# Patient Record
Sex: FEMALE | Race: BLACK OR AFRICAN AMERICAN | NOT HISPANIC OR LATINO | URBAN - METROPOLITAN AREA
[De-identification: names, ages, dates, MRNs, and addresses within clinical notes are randomized per-mention and may not be internally consistent; named-entity substitution may affect disease eponyms.]

---

## 2019-03-10 ENCOUNTER — INPATIENT (INPATIENT)
Facility: HOSPITAL | Age: 84
LOS: 3 days | Discharge: HOME | End: 2019-03-14
Attending: FAMILY MEDICINE | Admitting: FAMILY MEDICINE
Payer: COMMERCIAL

## 2019-03-10 VITALS
SYSTOLIC BLOOD PRESSURE: 154 MMHG | HEIGHT: 65 IN | TEMPERATURE: 98 F | DIASTOLIC BLOOD PRESSURE: 55 MMHG | RESPIRATION RATE: 17 BRPM | OXYGEN SATURATION: 97 % | HEART RATE: 92 BPM | WEIGHT: 108.91 LBS

## 2019-03-10 DIAGNOSIS — E43 UNSPECIFIED SEVERE PROTEIN-CALORIE MALNUTRITION: ICD-10-CM

## 2019-03-10 LAB
ALBUMIN SERPL ELPH-MCNC: 3.9 G/DL — SIGNIFICANT CHANGE UP (ref 3.3–5)
ALP SERPL-CCNC: 78 U/L — SIGNIFICANT CHANGE UP (ref 40–120)
ALT FLD-CCNC: 20 U/L — SIGNIFICANT CHANGE UP (ref 12–78)
ANION GAP SERPL CALC-SCNC: 10 MMOL/L — SIGNIFICANT CHANGE UP (ref 5–17)
APPEARANCE UR: CLEAR — SIGNIFICANT CHANGE UP
APTT BLD: 27.1 SEC — LOW (ref 28.5–37)
AST SERPL-CCNC: 20 U/L — SIGNIFICANT CHANGE UP (ref 15–37)
BACTERIA # UR AUTO: ABNORMAL
BASOPHILS # BLD AUTO: 0.02 K/UL — SIGNIFICANT CHANGE UP (ref 0–0.2)
BASOPHILS NFR BLD AUTO: 0.2 % — SIGNIFICANT CHANGE UP (ref 0–2)
BILIRUB SERPL-MCNC: 1.1 MG/DL — SIGNIFICANT CHANGE UP (ref 0.2–1.2)
BILIRUB UR-MCNC: NEGATIVE — SIGNIFICANT CHANGE UP
BUN SERPL-MCNC: 19 MG/DL — SIGNIFICANT CHANGE UP (ref 7–23)
CALCIUM SERPL-MCNC: 9.3 MG/DL — SIGNIFICANT CHANGE UP (ref 8.5–10.1)
CHLORIDE SERPL-SCNC: 105 MMOL/L — SIGNIFICANT CHANGE UP (ref 96–108)
CK MB CFR SERPL CALC: <1 NG/ML — SIGNIFICANT CHANGE UP (ref 0.5–3.6)
CO2 SERPL-SCNC: 27 MMOL/L — SIGNIFICANT CHANGE UP (ref 22–31)
COLOR SPEC: YELLOW — SIGNIFICANT CHANGE UP
CREAT SERPL-MCNC: 1.12 MG/DL — SIGNIFICANT CHANGE UP (ref 0.5–1.3)
DIFF PNL FLD: ABNORMAL
EOSINOPHIL # BLD AUTO: 0 K/UL — SIGNIFICANT CHANGE UP (ref 0–0.5)
EOSINOPHIL NFR BLD AUTO: 0 % — SIGNIFICANT CHANGE UP (ref 0–6)
EPI CELLS # UR: SIGNIFICANT CHANGE UP
GLUCOSE SERPL-MCNC: 118 MG/DL — HIGH (ref 70–99)
GLUCOSE UR QL: NEGATIVE MG/DL — SIGNIFICANT CHANGE UP
HCT VFR BLD CALC: 44.2 % — SIGNIFICANT CHANGE UP (ref 34.5–45)
HGB BLD-MCNC: 14.5 G/DL — SIGNIFICANT CHANGE UP (ref 11.5–15.5)
IMM GRANULOCYTES NFR BLD AUTO: 0.3 % — SIGNIFICANT CHANGE UP (ref 0–1.5)
INR BLD: 1.04 RATIO — SIGNIFICANT CHANGE UP (ref 0.88–1.16)
KETONES UR-MCNC: ABNORMAL
LACTATE SERPL-SCNC: 3.4 MMOL/L — HIGH (ref 0.7–2)
LEUKOCYTE ESTERASE UR-ACNC: ABNORMAL
LIDOCAIN IGE QN: 84 U/L — SIGNIFICANT CHANGE UP (ref 73–393)
LYMPHOCYTES # BLD AUTO: 0.41 K/UL — LOW (ref 1–3.3)
LYMPHOCYTES # BLD AUTO: 3.6 % — LOW (ref 13–44)
MAGNESIUM SERPL-MCNC: 1.9 MG/DL — SIGNIFICANT CHANGE UP (ref 1.6–2.6)
MCHC RBC-ENTMCNC: 29.4 PG — SIGNIFICANT CHANGE UP (ref 27–34)
MCHC RBC-ENTMCNC: 32.8 GM/DL — SIGNIFICANT CHANGE UP (ref 32–36)
MCV RBC AUTO: 89.7 FL — SIGNIFICANT CHANGE UP (ref 80–100)
MONOCYTES # BLD AUTO: 0.3 K/UL — SIGNIFICANT CHANGE UP (ref 0–0.9)
MONOCYTES NFR BLD AUTO: 2.6 % — SIGNIFICANT CHANGE UP (ref 2–14)
NEUTROPHILS # BLD AUTO: 10.74 K/UL — HIGH (ref 1.8–7.4)
NEUTROPHILS NFR BLD AUTO: 93.3 % — HIGH (ref 43–77)
NITRITE UR-MCNC: NEGATIVE — SIGNIFICANT CHANGE UP
NRBC # BLD: 0 /100 WBCS — SIGNIFICANT CHANGE UP (ref 0–0)
PH UR: 8 — SIGNIFICANT CHANGE UP (ref 5–8)
PLATELET # BLD AUTO: 197 K/UL — SIGNIFICANT CHANGE UP (ref 150–400)
POTASSIUM SERPL-MCNC: 3.5 MMOL/L — SIGNIFICANT CHANGE UP (ref 3.5–5.3)
POTASSIUM SERPL-SCNC: 3.5 MMOL/L — SIGNIFICANT CHANGE UP (ref 3.5–5.3)
PROT SERPL-MCNC: 7.7 GM/DL — SIGNIFICANT CHANGE UP (ref 6–8.3)
PROT UR-MCNC: 30 MG/DL
PROTHROM AB SERPL-ACNC: 11.7 SEC — SIGNIFICANT CHANGE UP (ref 10–12.9)
RBC # BLD: 4.93 M/UL — SIGNIFICANT CHANGE UP (ref 3.8–5.2)
RBC # FLD: 13.9 % — SIGNIFICANT CHANGE UP (ref 10.3–14.5)
RBC CASTS # UR COMP ASSIST: ABNORMAL /HPF (ref 0–4)
SODIUM SERPL-SCNC: 142 MMOL/L — SIGNIFICANT CHANGE UP (ref 135–145)
SP GR SPEC: 1.01 — SIGNIFICANT CHANGE UP (ref 1.01–1.02)
TROPONIN I SERPL-MCNC: <.015 NG/ML — SIGNIFICANT CHANGE UP (ref 0.01–0.04)
UROBILINOGEN FLD QL: NEGATIVE MG/DL — SIGNIFICANT CHANGE UP
WBC # BLD: 11.5 K/UL — HIGH (ref 3.8–10.5)
WBC # FLD AUTO: 11.5 K/UL — HIGH (ref 3.8–10.5)
WBC UR QL: ABNORMAL

## 2019-03-10 PROCEDURE — 99285 EMERGENCY DEPT VISIT HI MDM: CPT

## 2019-03-10 PROCEDURE — 74177 CT ABD & PELVIS W/CONTRAST: CPT | Mod: 26

## 2019-03-10 RX ORDER — MORPHINE SULFATE 50 MG/1
4 CAPSULE, EXTENDED RELEASE ORAL ONCE
Qty: 0 | Refills: 0 | Status: DISCONTINUED | OUTPATIENT
Start: 2019-03-10 | End: 2019-03-10

## 2019-03-10 NOTE — ED ADULT NURSE NOTE - FINAL NURSING ELECTRONIC SIGNATURE
TRANSITIONAL CARE MANAGEMENT - HOSPITAL DISCHARGE FOLLOW-UP    Contacted Ms. Fowler regarding follow-up for  after hospital discharge. She was discharged from the hospital on 02-07-19. Review of the After Visit Summary from the recent hospitalization indicates that the patient needs to with     She feels that she is doing well at home.   Her diet concern is none. Overall, the patient is eating well.   Ambulation: improved  Fever: is present  Pain: none  Activities of Daily Living (global): Self-care   Patient states that she does have sufficient family support. She feels that she is able to ask for assistance when needed.     Additional patient/family concerns: None .    Discharge medications were verified with the patient. She is fully compliant with the medication regimen prescribed at the time of discharge. She reports that she is not experiencing any medication side effects.    Upon discharge, the patient was to receive no additional services.     Patient has an appointment on 02-19-19 with Christianne Montes MD. Ms. Fowler was reminded about the importance of keeping this appointment.      11-Mar-2019 02:37

## 2019-03-10 NOTE — ED ADULT NURSE NOTE - OBJECTIVE STATEMENT
pt c/o of burning sensation in the GI tract and nausea  at triage. at this moment pt denies pain and nausea. pt is spitting out thick white mucus. pt has history of high BP doesn't remember name of meds

## 2019-03-11 DIAGNOSIS — K51.00 ULCERATIVE (CHRONIC) PANCOLITIS WITHOUT COMPLICATIONS: ICD-10-CM

## 2019-03-11 DIAGNOSIS — E78.2 MIXED HYPERLIPIDEMIA: ICD-10-CM

## 2019-03-11 DIAGNOSIS — I10 ESSENTIAL (PRIMARY) HYPERTENSION: ICD-10-CM

## 2019-03-11 DIAGNOSIS — E03.9 HYPOTHYROIDISM, UNSPECIFIED: ICD-10-CM

## 2019-03-11 DIAGNOSIS — K21.9 GASTRO-ESOPHAGEAL REFLUX DISEASE WITHOUT ESOPHAGITIS: ICD-10-CM

## 2019-03-11 DIAGNOSIS — Z29.9 ENCOUNTER FOR PROPHYLACTIC MEASURES, UNSPECIFIED: ICD-10-CM

## 2019-03-11 LAB
ANION GAP SERPL CALC-SCNC: 10 MMOL/L — SIGNIFICANT CHANGE UP (ref 5–17)
BASOPHILS # BLD AUTO: 0.02 K/UL — SIGNIFICANT CHANGE UP (ref 0–0.2)
BASOPHILS NFR BLD AUTO: 0.2 % — SIGNIFICANT CHANGE UP (ref 0–2)
BUN SERPL-MCNC: 19 MG/DL — SIGNIFICANT CHANGE UP (ref 7–23)
CALCIUM SERPL-MCNC: 9.1 MG/DL — SIGNIFICANT CHANGE UP (ref 8.5–10.1)
CHLORIDE SERPL-SCNC: 104 MMOL/L — SIGNIFICANT CHANGE UP (ref 96–108)
CO2 SERPL-SCNC: 27 MMOL/L — SIGNIFICANT CHANGE UP (ref 22–31)
CREAT SERPL-MCNC: 1.15 MG/DL — SIGNIFICANT CHANGE UP (ref 0.5–1.3)
EOSINOPHIL # BLD AUTO: 0 K/UL — SIGNIFICANT CHANGE UP (ref 0–0.5)
EOSINOPHIL NFR BLD AUTO: 0 % — SIGNIFICANT CHANGE UP (ref 0–6)
GLUCOSE BLDC GLUCOMTR-MCNC: 106 MG/DL — HIGH (ref 70–99)
GLUCOSE SERPL-MCNC: 106 MG/DL — HIGH (ref 70–99)
HCT VFR BLD CALC: 41 % — SIGNIFICANT CHANGE UP (ref 34.5–45)
HGB BLD-MCNC: 13.6 G/DL — SIGNIFICANT CHANGE UP (ref 11.5–15.5)
IMM GRANULOCYTES NFR BLD AUTO: 0.5 % — SIGNIFICANT CHANGE UP (ref 0–1.5)
LACTATE SERPL-SCNC: 1.9 MMOL/L — SIGNIFICANT CHANGE UP (ref 0.7–2)
LYMPHOCYTES # BLD AUTO: 0.92 K/UL — LOW (ref 1–3.3)
LYMPHOCYTES # BLD AUTO: 8.6 % — LOW (ref 13–44)
MCHC RBC-ENTMCNC: 29.7 PG — SIGNIFICANT CHANGE UP (ref 27–34)
MCHC RBC-ENTMCNC: 33.2 GM/DL — SIGNIFICANT CHANGE UP (ref 32–36)
MCV RBC AUTO: 89.5 FL — SIGNIFICANT CHANGE UP (ref 80–100)
MONOCYTES # BLD AUTO: 0.47 K/UL — SIGNIFICANT CHANGE UP (ref 0–0.9)
MONOCYTES NFR BLD AUTO: 4.4 % — SIGNIFICANT CHANGE UP (ref 2–14)
NEUTROPHILS # BLD AUTO: 9.19 K/UL — HIGH (ref 1.8–7.4)
NEUTROPHILS NFR BLD AUTO: 86.3 % — HIGH (ref 43–77)
NRBC # BLD: 0 /100 WBCS — SIGNIFICANT CHANGE UP (ref 0–0)
PLATELET # BLD AUTO: 214 K/UL — SIGNIFICANT CHANGE UP (ref 150–400)
POTASSIUM SERPL-MCNC: 3.6 MMOL/L — SIGNIFICANT CHANGE UP (ref 3.5–5.3)
POTASSIUM SERPL-SCNC: 3.6 MMOL/L — SIGNIFICANT CHANGE UP (ref 3.5–5.3)
RBC # BLD: 4.58 M/UL — SIGNIFICANT CHANGE UP (ref 3.8–5.2)
RBC # FLD: 14.3 % — SIGNIFICANT CHANGE UP (ref 10.3–14.5)
SODIUM SERPL-SCNC: 141 MMOL/L — SIGNIFICANT CHANGE UP (ref 135–145)
WBC # BLD: 10.65 K/UL — HIGH (ref 3.8–10.5)
WBC # FLD AUTO: 10.65 K/UL — HIGH (ref 3.8–10.5)

## 2019-03-11 PROCEDURE — 12345: CPT | Mod: NC

## 2019-03-11 PROCEDURE — 71045 X-RAY EXAM CHEST 1 VIEW: CPT | Mod: 26

## 2019-03-11 PROCEDURE — 99223 1ST HOSP IP/OBS HIGH 75: CPT

## 2019-03-11 RX ORDER — PANTOPRAZOLE SODIUM 20 MG/1
40 TABLET, DELAYED RELEASE ORAL
Qty: 0 | Refills: 0 | Status: DISCONTINUED | OUTPATIENT
Start: 2019-03-12 | End: 2019-03-14

## 2019-03-11 RX ORDER — ONDANSETRON 8 MG/1
4 TABLET, FILM COATED ORAL ONCE
Qty: 0 | Refills: 0 | Status: COMPLETED | OUTPATIENT
Start: 2019-03-11 | End: 2019-03-11

## 2019-03-11 RX ORDER — ATORVASTATIN CALCIUM 80 MG/1
10 TABLET, FILM COATED ORAL AT BEDTIME
Qty: 0 | Refills: 0 | Status: DISCONTINUED | OUTPATIENT
Start: 2019-03-11 | End: 2019-03-14

## 2019-03-11 RX ORDER — LEVOTHYROXINE SODIUM 125 MCG
25 TABLET ORAL DAILY
Qty: 0 | Refills: 0 | Status: DISCONTINUED | OUTPATIENT
Start: 2019-03-11 | End: 2019-03-14

## 2019-03-11 RX ORDER — METRONIDAZOLE 500 MG
500 TABLET ORAL ONCE
Qty: 0 | Refills: 0 | Status: COMPLETED | OUTPATIENT
Start: 2019-03-11 | End: 2019-03-11

## 2019-03-11 RX ORDER — PANTOPRAZOLE SODIUM 20 MG/1
40 TABLET, DELAYED RELEASE ORAL EVERY 12 HOURS
Qty: 0 | Refills: 0 | Status: DISCONTINUED | OUTPATIENT
Start: 2019-03-11 | End: 2019-03-11

## 2019-03-11 RX ORDER — ALPRAZOLAM 0.25 MG
0.25 TABLET ORAL THREE TIMES A DAY
Qty: 0 | Refills: 0 | Status: DISCONTINUED | OUTPATIENT
Start: 2019-03-11 | End: 2019-03-14

## 2019-03-11 RX ORDER — ONDANSETRON 8 MG/1
8 TABLET, FILM COATED ORAL EVERY 8 HOURS
Qty: 0 | Refills: 0 | Status: DISCONTINUED | OUTPATIENT
Start: 2019-03-11 | End: 2019-03-14

## 2019-03-11 RX ORDER — PANTOPRAZOLE SODIUM 20 MG/1
40 TABLET, DELAYED RELEASE ORAL ONCE
Qty: 0 | Refills: 0 | Status: COMPLETED | OUTPATIENT
Start: 2019-03-11 | End: 2019-03-11

## 2019-03-11 RX ORDER — MORPHINE SULFATE 50 MG/1
2 CAPSULE, EXTENDED RELEASE ORAL ONCE
Qty: 0 | Refills: 0 | Status: DISCONTINUED | OUTPATIENT
Start: 2019-03-11 | End: 2019-03-11

## 2019-03-11 RX ORDER — LISINOPRIL 2.5 MG/1
2.5 TABLET ORAL DAILY
Qty: 0 | Refills: 0 | Status: DISCONTINUED | OUTPATIENT
Start: 2019-03-11 | End: 2019-03-14

## 2019-03-11 RX ORDER — CIPROFLOXACIN LACTATE 400MG/40ML
200 VIAL (ML) INTRAVENOUS EVERY 12 HOURS
Qty: 0 | Refills: 0 | Status: DISCONTINUED | OUTPATIENT
Start: 2019-03-11 | End: 2019-03-12

## 2019-03-11 RX ORDER — METRONIDAZOLE 500 MG
500 TABLET ORAL EVERY 8 HOURS
Qty: 0 | Refills: 0 | Status: DISCONTINUED | OUTPATIENT
Start: 2019-03-11 | End: 2019-03-12

## 2019-03-11 RX ORDER — SODIUM CHLORIDE 9 MG/ML
1000 INJECTION, SOLUTION INTRAVENOUS
Qty: 0 | Refills: 0 | Status: DISCONTINUED | OUTPATIENT
Start: 2019-03-11 | End: 2019-03-11

## 2019-03-11 RX ORDER — CIPROFLOXACIN LACTATE 400MG/40ML
400 VIAL (ML) INTRAVENOUS ONCE
Qty: 0 | Refills: 0 | Status: COMPLETED | OUTPATIENT
Start: 2019-03-11 | End: 2019-03-11

## 2019-03-11 RX ORDER — HEPARIN SODIUM 5000 [USP'U]/ML
5000 INJECTION INTRAVENOUS; SUBCUTANEOUS EVERY 8 HOURS
Qty: 0 | Refills: 0 | Status: DISCONTINUED | OUTPATIENT
Start: 2019-03-11 | End: 2019-03-14

## 2019-03-11 RX ORDER — CIPROFLOXACIN LACTATE 400MG/40ML
400 VIAL (ML) INTRAVENOUS EVERY 12 HOURS
Qty: 0 | Refills: 0 | Status: DISCONTINUED | OUTPATIENT
Start: 2019-03-11 | End: 2019-03-11

## 2019-03-11 RX ADMIN — Medication 100 MILLIGRAM(S): at 11:14

## 2019-03-11 RX ADMIN — LISINOPRIL 2.5 MILLIGRAM(S): 2.5 TABLET ORAL at 05:33

## 2019-03-11 RX ADMIN — ONDANSETRON 4 MILLIGRAM(S): 8 TABLET, FILM COATED ORAL at 02:50

## 2019-03-11 RX ADMIN — HEPARIN SODIUM 5000 UNIT(S): 5000 INJECTION INTRAVENOUS; SUBCUTANEOUS at 05:34

## 2019-03-11 RX ADMIN — Medication 200 MILLIGRAM(S): at 01:51

## 2019-03-11 RX ADMIN — Medication 100 MILLIGRAM(S): at 17:39

## 2019-03-11 RX ADMIN — MORPHINE SULFATE 2 MILLIGRAM(S): 50 CAPSULE, EXTENDED RELEASE ORAL at 02:49

## 2019-03-11 RX ADMIN — Medication 100 MILLIGRAM(S): at 03:31

## 2019-03-11 RX ADMIN — PANTOPRAZOLE SODIUM 40 MILLIGRAM(S): 20 TABLET, DELAYED RELEASE ORAL at 05:34

## 2019-03-11 RX ADMIN — PANTOPRAZOLE SODIUM 40 MILLIGRAM(S): 20 TABLET, DELAYED RELEASE ORAL at 00:20

## 2019-03-11 RX ADMIN — Medication 100 MILLIGRAM(S): at 23:06

## 2019-03-11 RX ADMIN — Medication 25 MICROGRAM(S): at 05:33

## 2019-03-11 RX ADMIN — Medication 100 MILLIGRAM(S): at 13:00

## 2019-03-11 RX ADMIN — HEPARIN SODIUM 5000 UNIT(S): 5000 INJECTION INTRAVENOUS; SUBCUTANEOUS at 13:00

## 2019-03-11 RX ADMIN — HEPARIN SODIUM 5000 UNIT(S): 5000 INJECTION INTRAVENOUS; SUBCUTANEOUS at 21:16

## 2019-03-11 RX ADMIN — ATORVASTATIN CALCIUM 10 MILLIGRAM(S): 80 TABLET, FILM COATED ORAL at 21:15

## 2019-03-11 RX ADMIN — Medication 0.25 MILLIGRAM(S): at 20:32

## 2019-03-11 RX ADMIN — SODIUM CHLORIDE 80 MILLILITER(S): 9 INJECTION, SOLUTION INTRAVENOUS at 03:27

## 2019-03-11 NOTE — H&P ADULT - PROBLEM SELECTOR PLAN 1
Zofran 8 mg IVPB Q8hrly PRN for nausea/vomiting  Sent Stool culture, C.diff, Ova parasite and Blood culture x 2 sets  Continue Cipro and Flagyl  Tylenol 650mg PO Q8hrly PRN for fever & pain.  Start with Clear liquids PO when patient tolerates advance diet. Zofran 8 mg IVPB Q8hrly PRN for nausea/vomiting  Sent Stool culture, C.diff, Ova parasite and Blood culture x 2 sets  Continue Cipro and Flagyl  Tylenol 650mg PO Q8hrly PRN for fever & pain.  Start with Clear liquids PO when patient tolerates advance diet.  Would confirm PMH regarding UC vs diverticulitis with PMD.  Per patient and daughter, she was ever on steroids or other UC meds. Zofran 8 mg IVP Q8hrly PRN for nausea/vomiting  Sent Stool culture, Ova parasite and Blood culture x 2 sets  Continue Cipro and Flagyl  Tylenol 650mg PO Q8hrly PRN for fever & pain.  Start with Clear liquids PO when patient tolerates advance diet.  Would confirm PMH regarding UC vs diverticulitis with PMD.  Per patient and daughter, she was ever on steroids or other UC meds.

## 2019-03-11 NOTE — CHART NOTE - NSCHARTNOTEFT_GEN_A_CORE
Patient is a 85y old  Female who presents with a chief complaint of colitis (11 Mar 2019 01:39)        HPI:  85 year old woman with PMH HTN, hypothyroidism, HLD, gerd, (diverticulitis or UC? pt unsure), hiatal hernia presents with several days of abdominal pain, diarrhea, and nausea.  She states that she think she was diagnosed with either diverticulitis or UC many years ago; her daughter is bedside who does have UC and is unsure if patient has it or was ever on treatment for it.  Patient denies fever, chills, recent travel, food outside of routine, blood in stool.    CT ab/plv shows colitis without diverticulitis.  Mild leukocytosis. (11 Mar 2019 01:39)      SUBJECTIVE & OBJECTIVE: Pt seen and examined at bedside. she is tolerating clear liquids.  States that her abdominal pain in improving     PHYSICAL EXAM:  T(C): 37.3 (19 @ 11:48), Max: 37.6 (19 @ 03:08)  HR: 75 (19 @ 11:48) (72 - 92)  BP: 125/68 (19 @ 11:48) (122/68 - 154/55)  RR: 16 (19 @ 11:48) (16 - 18)  SpO2: 99% (19 @ 11:48) (97% - 99%)  Wt(kg): -- Height (cm): 165.1 ( @ 03:08)  Weight (kg): 49.6 ( @ 03:08)  BMI (kg/m2): 18.2 ( @ 03:08)  BSA (m2): 1.53 ( @ 03:08)  I&O's Detail    GENERAL: NAD, well-groomed, well-developed  HEAD:  Atraumatic, Normocephalic  EYES: EOMI, PERRLA, conjunctiva and sclera clear  ENMT: Moist mucous membranes  NECK: Supple, No JVD  NERVOUS SYSTEM:  Alert & Oriented X3, Motor Strength 5/5 B/L upper and lower extremities; DTRs 2+ intact and symmetric  CHEST/LUNG: Clear to auscultation bilaterally; No rales, rhonchi, wheezing, or rubs  HEART: Regular rate and rhythm; No murmurs, rubs, or gallops  ABDOMEN: Soft, Nontender, Nondistended; Bowel sounds present  EXTREMITIES:  2+ Peripheral Pulses, No clubbing, cyanosis, or edema    MEDICATIONS  (STANDING):  atorvastatin 10 milliGRAM(s) Oral at bedtime  ciprofloxacin   IVPB 200 milliGRAM(s) IV Intermittent every 12 hours  heparin  Injectable 5000 Unit(s) SubCutaneous every 8 hours  levothyroxine 25 MICROGram(s) Oral daily  lisinopril 2.5 milliGRAM(s) Oral daily  metroNIDAZOLE  IVPB 500 milliGRAM(s) IV Intermittent every 8 hours  sodium chloride 0.45%. 1000 milliLiter(s) (80 mL/Hr) IV Continuous <Continuous>    MEDICATIONS  (PRN):  ALPRAZolam 0.25 milliGRAM(s) Oral three times a day PRN anxiety  ondansetron Injectable 8 milliGRAM(s) IV Push every 8 hours PRN Nausea and/or Vomiting      LABS:                        13.6   10.65 )-----------( 214      ( 11 Mar 2019 08:18 )             41.0     03-11    141  |  104  |  19  ----------------------------<  106<H>  3.6   |  27  |  1.15    Ca    9.1      11 Mar 2019 08:18  Mg     1.9     03-10    TPro  7.7  /  Alb  3.9  /  TBili  1.1  /  DBili  x   /  AST  20  /  ALT  20  /  AlkPhos  78  03-10    PT/INR - ( 10 Mar 2019 22:13 )   PT: 11.7 sec;   INR: 1.04 ratio         PTT - ( 10 Mar 2019 22:13 )  PTT:27.1 sec  Urinalysis Basic - ( 10 Mar 2019 22:58 )    Color: Yellow / Appearance: Clear / S.010 / pH: x  Gluc: x / Ketone: Large  / Bili: Negative / Urobili: Negative mg/dL   Blood: x / Protein: 30 mg/dL / Nitrite: Negative   Leuk Esterase: Trace / RBC: 6-10 /HPF / WBC 11-25   Sq Epi: x / Non Sq Epi: Few / Bacteria: Few      CAPILLARY BLOOD GLUCOSE  POCT Blood Glucose.: 96 mg/dL (11 Mar 2019 09:07)      CARDIAC MARKERS ( 10 Mar 2019 22:13 )  <.015 ng/mL / x     / x     / x     / <1.0 ng/mL        RECENT CULTURES: pending urine culture      RADIOLOGY & ADDITIONAL TESTS:  < from: CT Abdomen and Pelvis w/ IV Cont (03.10.19 @ 23:49) >      IMPRESSION:    Duodenojejunitis. Colitis involving the transverse, descending and   sigmoid colon. No evidence of small bowel obstruction.    Benign-appearing 4.7 cm cyst in the left lower quadrant. Correlation with   prior imaging is suggested.    < end of copied text >        DVT/GI ppx  Discussed with pt @ bedside    A/P  85 year old woman with PMH HTN, hypothyroidism, HLD, gerd, (diverticulitis or UC? pt unsure), hiatal hernia presents with several days of abdominal pain, diarrhea, and nausea.  Patient will require admission for at least 2 midnights as detailed below:      Problem/Plan - 1:  ·  Problem: Pancolitis.  Plan: Zofran 8 mg IVP Q8hrly PRN for nausea/vomiting  Sent Stool culture, Ova parasite and Blood culture x 2 sets  Continue Cipro and Flagyl  Tylenol 650mg PO Q8hrly PRN for fever & pain.  Start Low fiber diet  Would confirm PMH regarding UC vs diverticulitis with PMD.  Per patient and daughter, she was ever on steroids or other UC meds.    Problem/Plan - 2:  ·  Problem: Essential hypertension.  Plan: Continue lisinopril.     Problem/Plan - 3:  ·  Problem: Hypothyroidism (acquired).  Plan: Continue Synthroid.     Problem/Plan - 4:  ·  Problem: Gastroesophageal reflux disease without esophagitis.  Plan: Continue PPI.     Problem/Plan - 5:  ·  Problem: Mixed hyperlipidemia.  Plan: Continue statin.     Problem/Plan - 6:  Problem: Preventive measure. Plan: heparin 5000 units SC q8h for DVT prophylaxis  General precautions.

## 2019-03-11 NOTE — ED PROVIDER NOTE - PHYSICAL EXAMINATION
Gen: Alert, NAD, thin but well groomed elderly  female   Head: NC, AT   Eyes: PERRL, EOMI, normal lids/conjunctiva  ENT: normal hearing, patent oropharynx without erythema/exudate, uvula midline  Neck: supple, no tenderness, Trachea midline  Pulm: Bilateral BS, normal resp effort, no wheeze/stridor/retractions  CV: RRR, no M/R/G, 2+ radial and dp pulses bl, no edema  Abd: soft, periumbilical ttp. +BS, no hepatosplenomegaly  Mskel: extremities x4 with normal ROM and no joint effusions. no ctl spine ttp.   Skin: no rash, no bruising   Neuro: AAOx3, no sensory/motor deficits, CN 2-12 intact

## 2019-03-11 NOTE — H&P ADULT - NSHPPHYSICALEXAM_GEN_ALL_CORE
GENERAL: NAD, well-groomed, well-developed  HEAD:  Atraumatic, Normocephalic  EYES: EOMI, PERRLA, conjunctiva and sclera clear  ENMT: No tonsillar erythema, exudates, or enlargement; Moist mucous membranes, No lesions  NECK: Supple, No JVD, Normal thyroid  NERVOUS SYSTEM:  Alert & Oriented X3, Good concentration  CHEST/LUNG: Clear to percussion bilaterally; No rales, rhonchi, wheezing, or rubs  HEART: Regular rate and rhythm; No murmurs, rubs, or gallops  ABDOMEN: Soft, tender without rebound or guarding, Nondistended; Bowel sounds present  EXTREMITIES: no clubbing, cyanosis, or edema  SKIN: no rashes or lesions  PSYCH: no rashes or lesions

## 2019-03-11 NOTE — H&P ADULT - ASSESSMENT
85 year old woman with PMH HTN, hypothyroidism, HLD, gerd, (diverticulitis or UC? pt unsure), hiatal hernia presents with several days of abdominal pain, diarrhea, and nausea.  Patient will require admission for at least 2 midnights as detailed below:    IMPROVE VTE Individual Risk Assessment          RISK                                                          Points    [  ] Previous VTE                                                3    [  ] Thrombophilia                                             2    [  ] Lower limb paralysis                                    2        (unable to hold up >15 seconds)      [  ] Current Cancer                                             2         (within 6 months)    [ x ] Immobilization > 24 hrs                              1    [  ] ICU/CCU stay > 24 hours                            1    [ x ] Age > 60                                                    1    IMPROVE VTE Score ___2______

## 2019-03-11 NOTE — H&P ADULT - HISTORY OF PRESENT ILLNESS
Pt admitted for colitis, in progress. 85 year old woman with PMH HTN, hypothyroidism, HLD, gerd, (diverticulitis or UC? pt unsure), hiatal hernia presents with several days of abdominal pain, diarrhea, and nausea.  She states that she think she was diagnosed with either diverticulitis or UC many years ago; her daughter is bedside who does have UC and is unsure if patient has it or was ever on treatment for it.  Patient denies fever, chills, recent travel, food outside of routine, blood in stool. 85 year old woman with PMH HTN, hypothyroidism, HLD, gerd, (diverticulitis or UC? pt unsure), hiatal hernia presents with several days of abdominal pain, diarrhea, and nausea.  She states that she think she was diagnosed with either diverticulitis or UC many years ago; her daughter is bedside who does have UC and is unsure if patient has it or was ever on treatment for it.  Patient denies fever, chills, recent travel, food outside of routine, blood in stool.    CT ab/plv shows colitis without diverticulitis.  Mild leukocytosis.

## 2019-03-11 NOTE — PHARMACOTHERAPY INTERVENTION NOTE - COMMENTS
spoke to dr arana to change protonix ivp to protonix 40mg orally once daily
crcl=28  cipro 400mg ivpb q12h changed to 200mg ivpb q12h

## 2019-03-11 NOTE — H&P ADULT - NSHPLABSRESULTS_GEN_ALL_CORE
Vital Signs Last 24 Hrs  T(C): 37.6 (11 Mar 2019 03:08), Max: 37.6 (11 Mar 2019 03:08)  T(F): 99.7 (11 Mar 2019 03:08), Max: 99.7 (11 Mar 2019 03:08)  HR: 72 (11 Mar 2019 03:08) (72 - 92)  BP: 122/68 (11 Mar 2019 03:08) (122/68 - 154/55)  BP(mean): --  RR: 16 (11 Mar 2019 03:08) (16 - 18)  SpO2: 99% (11 Mar 2019 03:08) (97% - 99%)          LABS:                        14.5   11.50 )-----------( 197      ( 10 Mar 2019 22:13 )             44.2     03-10    142  |  105  |  19  ----------------------------<  118<H>  3.5   |  27  |  1.12    Ca    9.3      10 Mar 2019 22:13  Mg     1.9     03-10    TPro  7.7  /  Alb  3.9  /  TBili  1.1  /  DBili  x   /  AST  20  /  ALT  20  /  AlkPhos  78  03-10    PT/INR - ( 10 Mar 2019 22:13 )   PT: 11.7 sec;   INR: 1.04 ratio         PTT - ( 10 Mar 2019 22:13 )  PTT:27.1 sec  Urinalysis Basic - ( 10 Mar 2019 22:58 )    Color: Yellow / Appearance: Clear / S.010 / pH: x  Gluc: x / Ketone: Large  / Bili: Negative / Urobili: Negative mg/dL   Blood: x / Protein: 30 mg/dL / Nitrite: Negative   Leuk Esterase: Trace / RBC: 6-10 /HPF / WBC 11-25   Sq Epi: x / Non Sq Epi: Few / Bacteria: Few        RADIOLOGY & ADDITIONAL STUDIES:    CT ab/Plv:  IMPRESSION:  Duodenojejunitis. Colitis involving the transverse, descending and   sigmoid colon. No evidence of small bowel obstruction.

## 2019-03-11 NOTE — PATIENT PROFILE ADULT - ABILITY TO HEAR (WITH HEARING AID OR HEARING APPLIANCE IF NORMALLY USED):
Left ear is very diminished/Mildly to Moderately Impaired: difficulty hearing in some environments or speaker may need to increase volume or speak distinctly

## 2019-03-11 NOTE — ED PROVIDER NOTE - OBJECTIVE STATEMENT
Pertinent PMH/PSH/FHx/SHx and Review of Systems contained within:  85F hx acid reflux, htn, hld, hypothyroidism pw abd pain largely periumbilical and sharp with nausea and vomiting x1 day. patient notes she has been having such bouts intermittently for years and is treated with asiphux. patient has no fever, diarrhea, cp, sob, rash, bleeding, weakness, rhinorrhea, vision blurring. she did not take anything additional for symptoms. she did not eat or drink anything unusual  Fh and Sh not otherwise contributory  ROS otherwise negative

## 2019-03-11 NOTE — ED PROVIDER NOTE - CLINICAL SUMMARY MEDICAL DECISION MAKING FREE TEXT BOX
pt pw abd pain with vomiting. labs show pancolitis and enteritis. will start on abx and ppi and admit.

## 2019-03-12 LAB
GLUCOSE BLDC GLUCOMTR-MCNC: 96 MG/DL — SIGNIFICANT CHANGE UP (ref 70–99)
GLUCOSE BLDC GLUCOMTR-MCNC: 97 MG/DL — SIGNIFICANT CHANGE UP (ref 70–99)

## 2019-03-12 PROCEDURE — 99233 SBSQ HOSP IP/OBS HIGH 50: CPT

## 2019-03-12 RX ORDER — METRONIDAZOLE 500 MG
500 TABLET ORAL EVERY 8 HOURS
Qty: 0 | Refills: 0 | Status: DISCONTINUED | OUTPATIENT
Start: 2019-03-12 | End: 2019-03-14

## 2019-03-12 RX ORDER — CIPROFLOXACIN LACTATE 400MG/40ML
250 VIAL (ML) INTRAVENOUS
Qty: 0 | Refills: 0 | Status: DISCONTINUED | OUTPATIENT
Start: 2019-03-12 | End: 2019-03-14

## 2019-03-12 RX ADMIN — Medication 250 MILLIGRAM(S): at 18:41

## 2019-03-12 RX ADMIN — Medication 30 MILLILITER(S): at 16:27

## 2019-03-12 RX ADMIN — Medication 100 MILLIGRAM(S): at 01:23

## 2019-03-12 RX ADMIN — Medication 25 MICROGRAM(S): at 05:24

## 2019-03-12 RX ADMIN — Medication 500 MILLIGRAM(S): at 22:33

## 2019-03-12 RX ADMIN — LISINOPRIL 2.5 MILLIGRAM(S): 2.5 TABLET ORAL at 05:24

## 2019-03-12 RX ADMIN — HEPARIN SODIUM 5000 UNIT(S): 5000 INJECTION INTRAVENOUS; SUBCUTANEOUS at 22:33

## 2019-03-12 RX ADMIN — Medication 100 MILLIGRAM(S): at 12:02

## 2019-03-12 RX ADMIN — HEPARIN SODIUM 5000 UNIT(S): 5000 INJECTION INTRAVENOUS; SUBCUTANEOUS at 13:48

## 2019-03-12 RX ADMIN — Medication 0.25 MILLIGRAM(S): at 17:54

## 2019-03-12 RX ADMIN — PANTOPRAZOLE SODIUM 40 MILLIGRAM(S): 20 TABLET, DELAYED RELEASE ORAL at 05:24

## 2019-03-12 RX ADMIN — ATORVASTATIN CALCIUM 10 MILLIGRAM(S): 80 TABLET, FILM COATED ORAL at 22:33

## 2019-03-12 RX ADMIN — Medication 0.25 MILLIGRAM(S): at 05:31

## 2019-03-12 RX ADMIN — Medication 100 MILLIGRAM(S): at 13:45

## 2019-03-12 NOTE — PROGRESS NOTE ADULT - SUBJECTIVE AND OBJECTIVE BOX
Patient is a 85y old  Female who presents with a chief complaint of colitis (11 Mar 2019 01:39)        HPI:  85 year old woman with PMH HTN, hypothyroidism, HLD, gerd, (diverticulitis or UC? pt unsure), hiatal hernia presents with several days of abdominal pain, diarrhea, and nausea.  She states that she think she was diagnosed with either diverticulitis or UC many years ago; her daughter is bedside who does have UC and is unsure if patient has it or was ever on treatment for it.  Patient denies fever, chills, recent travel, food outside of routine, blood in stool.    CT ab/plv shows colitis without diverticulitis.  Mild leukocytosis. (11 Mar 2019 01:39)      SUBJECTIVE & OBJECTIVE: Pt seen and examined at bedside. Doing well today no episodes of fever or diarrhea.     PHYSICAL EXAM:  T(C): 36.9 (19 @ 10:15), Max: 37.3 (19 @ 17:09)  HR: 76 (19 @ 10:15) (76 - 91)  BP: 132/88 (19 @ 10:15) (106/63 - 143/92)  RR: 17 (19 @ 10:15) (17 - 17)  SpO2: 98% (19 @ 10:15) (98% - 100%)  Wt(kg): --   I&O's Detail    11 Mar 2019 07:01  -  12 Mar 2019 07:00  --------------------------------------------------------  IN:    Oral Fluid: 240 mL  Total IN: 240 mL    OUT:  Total OUT: 0 mL    Total NET: 240 mL        GENERAL: thin, chronically ill appearing   HEAD:  Atraumatic, Normocephalic  EYES: EOMI, PERRLA, conjunctiva and sclera clear  ENMT: Moist mucous membranes  NECK: Supple, No JVD  NERVOUS SYSTEM:  Alert & Oriented X3, Motor Strength 5/5 B/L upper and lower extremities; DTRs 2+ intact and symmetric  CHEST/LUNG: Clear to auscultation bilaterally; No rales, rhonchi, wheezing, or rubs  HEART: Regular rate and rhythm; No murmurs, rubs, or gallops  ABDOMEN: Soft, Nontender, Nondistended; Bowel sounds present  EXTREMITIES:  2+ Peripheral Pulses, No clubbing, cyanosis, or edema    MEDICATIONS  (STANDING):  atorvastatin 10 milliGRAM(s) Oral at bedtime  ciprofloxacin   IVPB 200 milliGRAM(s) IV Intermittent every 12 hours  heparin  Injectable 5000 Unit(s) SubCutaneous every 8 hours  levothyroxine 25 MICROGram(s) Oral daily  lisinopril 2.5 milliGRAM(s) Oral daily  metroNIDAZOLE  IVPB 500 milliGRAM(s) IV Intermittent every 8 hours  pantoprazole    Tablet 40 milliGRAM(s) Oral before breakfast    MEDICATIONS  (PRN):  ALPRAZolam 0.25 milliGRAM(s) Oral three times a day PRN anxiety  aluminum hydroxide/magnesium hydroxide/simethicone Suspension 30 milliLiter(s) Oral every 6 hours PRN Dyspepsia  ondansetron Injectable 8 milliGRAM(s) IV Push every 8 hours PRN Nausea and/or Vomiting      LABS:                        13.6   10.65 )-----------( 214      ( 11 Mar 2019 08:18 )             41.0     03-11    141  |  104  |  19  ----------------------------<  106<H>  3.6   |  27  |  1.15    Ca    9.1      11 Mar 2019 08:18  Mg     1.9     03-10    TPro  7.7  /  Alb  3.9  /  TBili  1.1  /  DBili  x   /  AST  20  /  ALT  20  /  AlkPhos  78  03-10    PT/INR - ( 10 Mar 2019 22:13 )   PT: 11.7 sec;   INR: 1.04 ratio         PTT - ( 10 Mar 2019 22:13 )  PTT:27.1 sec  Urinalysis Basic - ( 10 Mar 2019 22:58 )    Color: Yellow / Appearance: Clear / S.010 / pH: x  Gluc: x / Ketone: Large  / Bili: Negative / Urobili: Negative mg/dL   Blood: x / Protein: 30 mg/dL / Nitrite: Negative   Leuk Esterase: Trace / RBC: 6-10 /HPF / WBC 11-25   Sq Epi: x / Non Sq Epi: Few / Bacteria: Few      CAPILLARY BLOOD GLUCOSE      POCT Blood Glucose.: 97 mg/dL (12 Mar 2019 16:32)  POCT Blood Glucose.: 106 mg/dL (11 Mar 2019 17:35)      CARDIAC MARKERS ( 10 Mar 2019 22:13 )  <.015 ng/mL / x     / x     / x     / <1.0 ng/mL        RECENT CULTURES:      RADIOLOGY & ADDITIONAL TESTS:      DVT/GI ppx  Discussed with pt @ bedside

## 2019-03-12 NOTE — PROGRESS NOTE ADULT - NSICDXPROBLEM_GEN_ALL_CORE_FT
PROBLEM DIAGNOSES  Problem: Pancolitis  Assessment and Plan: c/w IV abx  GI Eval,   low residual diet

## 2019-03-12 NOTE — PROGRESS NOTE ADULT - ASSESSMENT
86 y/o female with diarrhea and abdominal pain     Problem/Plan - 1:  ·  Problem: Pancolitis.  Plan: Zofran 8 mg IVP Q8hrly PRN for nausea/vomiting  Sent Stool culture, Ova parasite and Blood culture x 2 sets  Continue Cipro and Flagyl  Tylenol 650mg PO Q8hrly PRN for fever & pain.  continue low fiber diet  GI eval      Problem/Plan - 2:  ·  Problem: Essential hypertension.  Plan: Continue lisinopril.     Problem/Plan - 3:  ·  Problem: Hypothyroidism (acquired).  Plan: Continue Synthroid.     Problem/Plan - 4:  ·  Problem: Gastroesophageal reflux disease without esophagitis.  Plan: Continue PPI.     Problem/Plan - 5:  ·  Problem: Mixed hyperlipidemia.  Plan: Continue statin.

## 2019-03-12 NOTE — CONSULT NOTE ADULT - ASSESSMENT
HPI:  85 year old woman with PMH HTN, hypothyroidism, HLD, gerd, (diverticulitis or UC? pt unsure), hiatal hernia presents with several days of abdominal pain, diarrhea, and nausea.  She states that she think she was diagnosed with either diverticulitis or UC many years ago; her daughter is bedside who does have UC and is unsure if patient has it or was ever on treatment for it.  Patient denies fever, chills, recent travel, food outside of routine, blood in stool.    CT ab/plv shows colitis without diverticulitis.  Mild leukocytosis. (11 Mar 2019 01:39)  ----------------------- As Above -----------------------------------  Histopry obtained from patient and daughter. Patient presented with significant burning in her chest that radiated to her neck. This is not unusual but it is never to this degree or as frequent. The patient is on Acufex for it.  She also had mucus the size of a quarter through out Sunday. ( non bloody ) It is not associated with her previous flare ups for her UC. The patient states that she has a history of UC diagnosed > 20 years ago. No flare ups for a while. She is on no meds for it. Last colonoscopy ( negative ) was 3 - 5 years ag.  Patient states that she had no BMs since arrival. Chest slightly better. Biggest problem today is anxiety. Tolerating regular diet.  See CT scan / labs    1) GERD - Carafate, PPI, anti reflux regiment  2) Colitis - Significantly better. - Change anti biotics to PO. Patient stable from GI point of view for discharge in AM if she remains asymptomatic and tolerates diet.

## 2019-03-12 NOTE — CONSULT NOTE ADULT - SUBJECTIVE AND OBJECTIVE BOX
HPI:  85 year old woman with PMH HTN, hypothyroidism, HLD, gerd, (diverticulitis or UC? pt unsure), hiatal hernia presents with several days of abdominal pain, diarrhea, and nausea.  She states that she think she was diagnosed with either diverticulitis or UC many years ago; her daughter is bedside who does have UC and is unsure if patient has it or was ever on treatment for it.  Patient denies fever, chills, recent travel, food outside of routine, blood in stool.    CT ab/plv shows colitis without diverticulitis.  Mild leukocytosis. (11 Mar 2019 01:39)  ----------------------- As Above -----------------------------------  Histopry obtained from patient and daughter. Patient presented with significant burning in her chest that radiated to her neck. This is not unusual but it is never to this degree or as frequent. The patient is on Acufex for it.  She also had       PAST MEDICAL & SURGICAL HISTORY:      MEDICATIONS  (STANDING):  atorvastatin 10 milliGRAM(s) Oral at bedtime  ciprofloxacin   IVPB 200 milliGRAM(s) IV Intermittent every 12 hours  heparin  Injectable 5000 Unit(s) SubCutaneous every 8 hours  levothyroxine 25 MICROGram(s) Oral daily  lisinopril 2.5 milliGRAM(s) Oral daily  metroNIDAZOLE  IVPB 500 milliGRAM(s) IV Intermittent every 8 hours  pantoprazole    Tablet 40 milliGRAM(s) Oral before breakfast    MEDICATIONS  (PRN):  ALPRAZolam 0.25 milliGRAM(s) Oral three times a day PRN anxiety  aluminum hydroxide/magnesium hydroxide/simethicone Suspension 30 milliLiter(s) Oral every 6 hours PRN Dyspepsia  ondansetron Injectable 8 milliGRAM(s) IV Push every 8 hours PRN Nausea and/or Vomiting      Allergies    No Known Allergies    Intolerances    penicillins (Unknown)      FAMILY HISTORY:      REVIEW OF SYSTEMS:    CONSTITUTIONAL: No fever, weight loss, or fatigue  EYES: No eye pain, visual disturbances, or discharge  ENMT:  No difficulty hearing, tinnitus, vertigo; No sinus or throat pain  NECK: No pain or stiffness  BREASTS: No pain, masses, or nipple discharge  RESPIRATORY: No cough, wheezing, chills or hemoptysis; No shortness of breath  CARDIOVASCULAR: No chest pain, palpitations, dizziness, or leg swelling  GASTROINTESTINAL: No abdominal or epigastric pain. No nausea, vomiting, or hematemesis; No diarrhea or constipation. No melena or hematochezia.  GENITOURINARY: No dysuria, frequency, hematuria, or incontinence  NEUROLOGICAL: No headaches, memory loss, loss of strength, numbness, or tremors  SKIN: No itching, burning, rashes, or lesions   LYMPH NODES: No enlarged glands  ENDOCRINE: No heat or cold intolerance; No hair loss  MUSCULOSKELETAL: No joint pain or swelling; No muscle, back, or extremity pain  PSYCHIATRIC: No depression, anxiety, mood swings, or difficulty sleeping  HEME/LYMPH: No easy bruising, or bleeding gums  ALLERGY AND IMMUNOLOGIC: No hives or eczema          SOCIAL HISTORY:    FAMILY HISTORY:      Vital Signs Last 24 Hrs  T(C): 36.9 (12 Mar 2019 10:15), Max: 37.3 (11 Mar 2019 17:09)  T(F): 98.4 (12 Mar 2019 10:15), Max: 99.2 (11 Mar 2019 17:09)  HR: 76 (12 Mar 2019 10:15) (76 - 91)  BP: 132/88 (12 Mar 2019 10:15) (106/63 - 143/92)  BP(mean): --  RR: 17 (12 Mar 2019 10:15) (17 - 17)  SpO2: 98% (12 Mar 2019 10:15) (98% - 100%)    PHYSICAL EXAM:    GENERAL: NAD, well-groomed, well-developed  HEAD:  Atraumatic, Normocephalic  EYES: EOMI, PERRLA, conjunctiva and sclera clear  ENMT: No tonsillar erythema, exudates, or enlargement; Moist mucous membranes, Good dentition, No lesions  NECK: Supple, No JVD, Normal thyroid  NERVOUS SYSTEM:  Alert & Oriented X3, Good concentration; Motor Strength 5/5 B/L upper and lower extremities; DTRs 2+ intact and symmetric  CHEST/LUNG: Clear to percussion bilaterally; No rales, rhonchi, wheezing, or rubs  HEART: Regular rate and rhythm; No murmurs, rubs, or gallops  ABDOMEN: Soft, Nontender, Nondistended; Bowel sounds present  EXTREMITIES:  2+ Peripheral Pulses, No clubbing, cyanosis, or edema  LYMPH: No lymphadenopathy noted   RECTAL: No masses, prostate normal size and smooth, Guaiaci negative   BREAST: No palpable masses, skin no lesions no retractions, no discharges. adnexal no palpable masses noted   GYN: uterus normal size, adnexal, no palpable masses, no CMT, no uterine discharge   SKIN: No rashes or lesions    LABS:                        13.6   10.65 )-----------( 214      ( 11 Mar 2019 08:18 )             41.0       CBC:  03-11 @ 08:18  WBC  10.65  HGB 13.6  HCT 41.0 Plate 214  MCV 89.5  03-10 @ 22:13  WBC  11.50  HGB 14.5  HCT 44.2 Plate 197  MCV 89.7           11 Mar 2019 08:18    141    |  104    |  19     ----------------------------<  106    3.6     |  27     |  1.15   10 Mar 2019 22:13    142    |  105    |  19     ----------------------------<  118    3.5     |  27     |  1.12     Ca    9.1        11 Mar 2019 08:18  Ca    9.3        10 Mar 2019 22:13  Mg     1.9       10 Mar 2019 22:13    TPro  7.7    /  Alb  3.9    /  TBili  1.1    /  DBili  x      /  AST  20     /  ALT  20     /  AlkPhos  78     10 Mar 2019 22:13    PT/INR - ( 10 Mar 2019 22:13 )   PT: 11.7 sec;   INR: 1.04 ratio         PTT - ( 10 Mar 2019 22:13 )  PTT:27.1 sec  Urinalysis Basic - ( 10 Mar 2019 22:58 )    Color: Yellow / Appearance: Clear / S.010 / pH: x  Gluc: x / Ketone: Large  / Bili: Negative / Urobili: Negative mg/dL   Blood: x / Protein: 30 mg/dL / Nitrite: Negative   Leuk Esterase: Trace / RBC: 6-10 /HPF / WBC 11-25   Sq Epi: x / Non Sq Epi: Few / Bacteria: Few          RADIOLOGY & ADDITIONAL STUDIES: HPI:  85 year old woman with PMH HTN, hypothyroidism, HLD, gerd, (diverticulitis or UC? pt unsure), hiatal hernia presents with several days of abdominal pain, diarrhea, and nausea.  She states that she think she was diagnosed with either diverticulitis or UC many years ago; her daughter is bedside who does have UC and is unsure if patient has it or was ever on treatment for it.  Patient denies fever, chills, recent travel, food outside of routine, blood in stool.    CT ab/plv shows colitis without diverticulitis.  Mild leukocytosis. (11 Mar 2019 01:39)  ----------------------- As Above -----------------------------------  Histopry obtained from patient and daughter. Patient presented with significant burning in her chest that radiated to her neck. This is not unusual but it is never to this degree or as frequent. The patient is on Acufex for it.  She also had mucus the size of a quarter through out . ( non bloody ) It is not associated with her previous flare ups for her UC. The patient states that she has a history of UC diagnosed > 20 years ago. No flare ups for a while. She is on no meds for it. Last colonoscopy ( negative ) was 3 - 5 years ag.  Patient states that she had no BMs since arrival. Chest slightly better. Biggest problem today is anxiety. Tolerating regular diet.  See CT scan / labs  Add: Patient states liquid Carafate helps her GERD symptoms    PAST MEDICAL & SURGICAL HISTORY:      MEDICATIONS  (STANDING):  atorvastatin 10 milliGRAM(s) Oral at bedtime  ciprofloxacin   IVPB 200 milliGRAM(s) IV Intermittent every 12 hours  heparin  Injectable 5000 Unit(s) SubCutaneous every 8 hours  levothyroxine 25 MICROGram(s) Oral daily  lisinopril 2.5 milliGRAM(s) Oral daily  metroNIDAZOLE  IVPB 500 milliGRAM(s) IV Intermittent every 8 hours  pantoprazole    Tablet 40 milliGRAM(s) Oral before breakfast    MEDICATIONS  (PRN):  ALPRAZolam 0.25 milliGRAM(s) Oral three times a day PRN anxiety  aluminum hydroxide/magnesium hydroxide/simethicone Suspension 30 milliLiter(s) Oral every 6 hours PRN Dyspepsia  ondansetron Injectable 8 milliGRAM(s) IV Push every 8 hours PRN Nausea and/or Vomiting      Allergies    No Known Allergies    Intolerances    penicillins (Unknown)      FAMILY HISTORY:      REVIEW OF SYSTEMS:    CONSTITUTIONAL: No fever, weight loss,   EYES: No eye pain, visual disturbances, or discharge  ENMT:  No difficulty hearing, tinnitus, vertigo; No sinus or throat pain  NECK: No pain or stiffness  BREASTS: No pain, masses, or nipple discharge  RESPIRATORY: No cough, wheezing, chills or hemoptysis; No shortness of breath  CARDIOVASCULAR: No chest pain, palpitations, dizziness, or leg swelling  GASTROINTESTINAL: See above   GENITOURINARY: No dysuria, frequency, hematuria, or incontinence  NEUROLOGICAL: No headaches, memory loss, loss of strength, numbness, or tremors  SKIN: No itching, burning, rashes, or lesions   LYMPH NODES: No enlarged glands  ENDOCRINE: No heat or cold intolerance; No hair loss  MUSCULOSKELETAL: No joint pain or swelling; No muscle, back, or extremity pain  PSYCHIATRIC: No depression, anxiety, mood swings, or difficulty sleeping  HEME/LYMPH: No easy bruising, or bleeding gums  ALLERGY AND IMMUNOLOGIC: No hives or eczema          SOCIAL HISTORY:    FAMILY HISTORY:      Vital Signs Last 24 Hrs  T(C): 36.9 (12 Mar 2019 10:15), Max: 37.3 (11 Mar 2019 17:09)  T(F): 98.4 (12 Mar 2019 10:15), Max: 99.2 (11 Mar 2019 17:09)  HR: 76 (12 Mar 2019 10:15) (76 - 91)  BP: 132/88 (12 Mar 2019 10:15) (106/63 - 143/92)  BP(mean): --  RR: 17 (12 Mar 2019 10:15) (17 - 17)  SpO2: 98% (12 Mar 2019 10:15) (98% - 100%)    PHYSICAL EXAM:    GENERAL: NAD, well-groomed, well-developed  HEAD:  Atraumatic, Normocephalic  EYES: EOMI, PERRLA, conjunctiva and sclera clear  NECK: Supple, No JVD, Normal thyroid  NERVOUS SYSTEM:  Alert & Oriented X3, Good concentration;  CHEST/LUNG: Clear to percussion bilaterally; No rales, rhonchi, wheezing, or rubs  HEART: Regular rate and rhythm; No murmurs, rubs, or gallops  ABDOMEN: Soft, Nontender, Nondistended; Bowel sounds present  EXTREMITIES:  2+ Peripheral Pulses, No clubbing, cyanosis, or edema  LYMPH: No lymphadenopathy noted   RECTAL: Deferred   SKIN: No rashes or lesions    LABS:                        13.6   10. )-----------( 214      ( 11 Mar 2019 08:18 )             41.0       CBC:  03 @ 08:18  WBC  10.65  HGB 13.6  HCT 41.0 Plate 214  MCV 89.5  03-10 @ 22:13  WBC  11.50  HGB 14.5  HCT 44.2 Plate 197  MCV 89.7           11 Mar 2019 08:18    141    |  104    |  19     ----------------------------<  106    3.6     |  27     |  1.15   10 Mar 2019 22:13    142    |  105    |  19     ----------------------------<  118    3.5     |  27     |  1.12     Ca    9.1        11 Mar 2019 08:18  Ca    9.3        10 Mar 2019 22:13  Mg     1.9       10 Mar 2019 22:13    TPro  7.7    /  Alb  3.9    /  TBili  1.1    /  DBili  x      /  AST  20     /  ALT  20     /  AlkPhos  78     10 Mar 2019 22:13    PT/INR - ( 10 Mar 2019 22:13 )   PT: 11.7 sec;   INR: 1.04 ratio         PTT - ( 10 Mar 2019 22:13 )  PTT:27.1 sec  Urinalysis Basic - ( 10 Mar 2019 22:58 )    Color: Yellow / Appearance: Clear / S.010 / pH: x  Gluc: x / Ketone: Large  / Bili: Negative / Urobili: Negative mg/dL   Blood: x / Protein: 30 mg/dL / Nitrite: Negative   Leuk Esterase: Trace / RBC: 6-10 /HPF / WBC 11-25   Sq Epi: x / Non Sq Epi: Few / Bacteria: Few          RADIOLOGY & ADDITIONAL STUDIES:

## 2019-03-13 LAB
ANION GAP SERPL CALC-SCNC: 7 MMOL/L — SIGNIFICANT CHANGE UP (ref 5–17)
BASOPHILS # BLD AUTO: 0.02 K/UL — SIGNIFICANT CHANGE UP (ref 0–0.2)
BASOPHILS NFR BLD AUTO: 0.3 % — SIGNIFICANT CHANGE UP (ref 0–2)
BUN SERPL-MCNC: 17 MG/DL — SIGNIFICANT CHANGE UP (ref 7–23)
CALCIUM SERPL-MCNC: 8.2 MG/DL — LOW (ref 8.5–10.1)
CHLORIDE SERPL-SCNC: 108 MMOL/L — SIGNIFICANT CHANGE UP (ref 96–108)
CO2 SERPL-SCNC: 26 MMOL/L — SIGNIFICANT CHANGE UP (ref 22–31)
CREAT SERPL-MCNC: 0.99 MG/DL — SIGNIFICANT CHANGE UP (ref 0.5–1.3)
EOSINOPHIL # BLD AUTO: 0.08 K/UL — SIGNIFICANT CHANGE UP (ref 0–0.5)
EOSINOPHIL NFR BLD AUTO: 1.2 % — SIGNIFICANT CHANGE UP (ref 0–6)
GLUCOSE BLDC GLUCOMTR-MCNC: 85 MG/DL — SIGNIFICANT CHANGE UP (ref 70–99)
GLUCOSE BLDC GLUCOMTR-MCNC: 92 MG/DL — SIGNIFICANT CHANGE UP (ref 70–99)
GLUCOSE BLDC GLUCOMTR-MCNC: 94 MG/DL — SIGNIFICANT CHANGE UP (ref 70–99)
GLUCOSE BLDC GLUCOMTR-MCNC: 97 MG/DL — SIGNIFICANT CHANGE UP (ref 70–99)
GLUCOSE SERPL-MCNC: 98 MG/DL — SIGNIFICANT CHANGE UP (ref 70–99)
HCT VFR BLD CALC: 33.9 % — LOW (ref 34.5–45)
HGB BLD-MCNC: 11.3 G/DL — LOW (ref 11.5–15.5)
IMM GRANULOCYTES NFR BLD AUTO: 0.5 % — SIGNIFICANT CHANGE UP (ref 0–1.5)
LYMPHOCYTES # BLD AUTO: 1.23 K/UL — SIGNIFICANT CHANGE UP (ref 1–3.3)
LYMPHOCYTES # BLD AUTO: 18.7 % — SIGNIFICANT CHANGE UP (ref 13–44)
MCHC RBC-ENTMCNC: 29.9 PG — SIGNIFICANT CHANGE UP (ref 27–34)
MCHC RBC-ENTMCNC: 33.3 GM/DL — SIGNIFICANT CHANGE UP (ref 32–36)
MCV RBC AUTO: 89.7 FL — SIGNIFICANT CHANGE UP (ref 80–100)
MONOCYTES # BLD AUTO: 0.62 K/UL — SIGNIFICANT CHANGE UP (ref 0–0.9)
MONOCYTES NFR BLD AUTO: 9.4 % — SIGNIFICANT CHANGE UP (ref 2–14)
NEUTROPHILS # BLD AUTO: 4.6 K/UL — SIGNIFICANT CHANGE UP (ref 1.8–7.4)
NEUTROPHILS NFR BLD AUTO: 69.9 % — SIGNIFICANT CHANGE UP (ref 43–77)
NRBC # BLD: 0 /100 WBCS — SIGNIFICANT CHANGE UP (ref 0–0)
PLATELET # BLD AUTO: 149 K/UL — LOW (ref 150–400)
POTASSIUM SERPL-MCNC: 3.1 MMOL/L — LOW (ref 3.5–5.3)
POTASSIUM SERPL-SCNC: 3.1 MMOL/L — LOW (ref 3.5–5.3)
RBC # BLD: 3.78 M/UL — LOW (ref 3.8–5.2)
RBC # FLD: 14.4 % — SIGNIFICANT CHANGE UP (ref 10.3–14.5)
SODIUM SERPL-SCNC: 141 MMOL/L — SIGNIFICANT CHANGE UP (ref 135–145)
WBC # BLD: 6.58 K/UL — SIGNIFICANT CHANGE UP (ref 3.8–10.5)
WBC # FLD AUTO: 6.58 K/UL — SIGNIFICANT CHANGE UP (ref 3.8–10.5)

## 2019-03-13 PROCEDURE — 99233 SBSQ HOSP IP/OBS HIGH 50: CPT

## 2019-03-13 RX ADMIN — Medication 250 MILLIGRAM(S): at 05:20

## 2019-03-13 RX ADMIN — HEPARIN SODIUM 5000 UNIT(S): 5000 INJECTION INTRAVENOUS; SUBCUTANEOUS at 21:15

## 2019-03-13 RX ADMIN — Medication 0.25 MILLIGRAM(S): at 21:15

## 2019-03-13 RX ADMIN — Medication 500 MILLIGRAM(S): at 21:15

## 2019-03-13 RX ADMIN — PANTOPRAZOLE SODIUM 40 MILLIGRAM(S): 20 TABLET, DELAYED RELEASE ORAL at 05:19

## 2019-03-13 RX ADMIN — Medication 500 MILLIGRAM(S): at 14:26

## 2019-03-13 RX ADMIN — HEPARIN SODIUM 5000 UNIT(S): 5000 INJECTION INTRAVENOUS; SUBCUTANEOUS at 05:20

## 2019-03-13 RX ADMIN — Medication 30 MILLILITER(S): at 08:52

## 2019-03-13 RX ADMIN — Medication 250 MILLIGRAM(S): at 18:29

## 2019-03-13 RX ADMIN — Medication 500 MILLIGRAM(S): at 05:20

## 2019-03-13 RX ADMIN — ATORVASTATIN CALCIUM 10 MILLIGRAM(S): 80 TABLET, FILM COATED ORAL at 21:15

## 2019-03-13 RX ADMIN — HEPARIN SODIUM 5000 UNIT(S): 5000 INJECTION INTRAVENOUS; SUBCUTANEOUS at 14:26

## 2019-03-13 RX ADMIN — Medication 25 MICROGRAM(S): at 05:19

## 2019-03-13 RX ADMIN — LISINOPRIL 2.5 MILLIGRAM(S): 2.5 TABLET ORAL at 05:19

## 2019-03-13 NOTE — PROGRESS NOTE ADULT - SUBJECTIVE AND OBJECTIVE BOX
Patient is a 85y old  Female who presents with a chief complaint of colitis (13 Mar 2019 11:35)        HPI:  85 year old woman with PMH HTN, hypothyroidism, HLD, gerd, (diverticulitis or UC? pt unsure), hiatal hernia presents with several days of abdominal pain, diarrhea, and nausea.  She states that she think she was diagnosed with either diverticulitis or UC many years ago; her daughter is bedside who does have UC and is unsure if patient has it or was ever on treatment for it.  Patient denies fever, chills, recent travel, food outside of routine, blood in stool.    CT ab/plv shows colitis without diverticulitis.  Mild leukocytosis. (11 Mar 2019 01:39)      SUBJECTIVE & OBJECTIVE: Pt seen and examined at bedside.     PHYSICAL EXAM:  T(C): 36.5 (03-13-19 @ 18:43), Max: 37.1 (03-13-19 @ 05:10)  HR: 83 (03-13-19 @ 19:25) (72 - 92)  BP: 111/65 (03-13-19 @ 19:25) (91/55 - 138/80)  RR: 17 (03-13-19 @ 18:43) (16 - 18)  SpO2: 98% (03-13-19 @ 19:25) (97% - 100%)  Wt(kg): --   I&O's Detail    GENERAL: NAD, well-groomed, well-developed  HEAD:  Atraumatic, Normocephalic  EYES: EOMI, PERRLA, conjunctiva and sclera clear  ENMT: Moist mucous membranes  NECK: Supple, No JVD  NERVOUS SYSTEM:  Alert & Oriented X3, Motor Strength 5/5 B/L upper and lower extremities; DTRs 2+ intact and symmetric  CHEST/LUNG: Clear to auscultation bilaterally; No rales, rhonchi, wheezing, or rubs  HEART: Regular rate and rhythm; No murmurs, rubs, or gallops  ABDOMEN: Soft, Nontender, Nondistended; Bowel sounds present  EXTREMITIES:  2+ Peripheral Pulses, No clubbing, cyanosis, or edema    MEDICATIONS  (STANDING):  atorvastatin 10 milliGRAM(s) Oral at bedtime  ciprofloxacin     Tablet 250 milliGRAM(s) Oral two times a day  heparin  Injectable 5000 Unit(s) SubCutaneous every 8 hours  levothyroxine 25 MICROGram(s) Oral daily  lisinopril 2.5 milliGRAM(s) Oral daily  metroNIDAZOLE    Tablet 500 milliGRAM(s) Oral every 8 hours  pantoprazole    Tablet 40 milliGRAM(s) Oral before breakfast    MEDICATIONS  (PRN):  ALPRAZolam 0.25 milliGRAM(s) Oral three times a day PRN anxiety  aluminum hydroxide/magnesium hydroxide/simethicone Suspension 30 milliLiter(s) Oral every 6 hours PRN Dyspepsia  ondansetron Injectable 8 milliGRAM(s) IV Push every 8 hours PRN Nausea and/or Vomiting      LABS:                        11.3   6.58  )-----------( 149      ( 13 Mar 2019 06:32 )             33.9     03-13    141  |  108  |  17  ----------------------------<  98  3.1<L>   |  26  |  0.99    Ca    8.2<L>      13 Mar 2019 06:32          CAPILLARY BLOOD GLUCOSE      POCT Blood Glucose.: 97 mg/dL (13 Mar 2019 16:46)  POCT Blood Glucose.: 94 mg/dL (13 Mar 2019 11:11)  POCT Blood Glucose.: 85 mg/dL (13 Mar 2019 07:51)  POCT Blood Glucose.: 96 mg/dL (12 Mar 2019 23:12)            RECENT CULTURES:      RADIOLOGY & ADDITIONAL TESTS:      DVT/GI ppx  Discussed with pt @ bedside Patient is a 85y old  Female who presents with a chief complaint of colitis (13 Mar 2019 11:35)        HPI:  85 year old woman with PMH HTN, hypothyroidism, HLD, gerd, (diverticulitis or UC? pt unsure), hiatal hernia presents with several days of abdominal pain, diarrhea, and nausea.  She states that she think she was diagnosed with either diverticulitis or UC many years ago; her daughter is bedside who does have UC and is unsure if patient has it or was ever on treatment for it.  Patient denies fever, chills, recent travel, food outside of routine, blood in stool.    CT ab/plv shows colitis without diverticulitis.  Mild leukocytosis. (11 Mar 2019 01:39)      SUBJECTIVE & OBJECTIVE: Pt seen and examined at bedside. She states that she is feeling well.  Denies further episodes of diarrhea and denies abdominal pain.  She is tolerating diet.      PHYSICAL EXAM:  T(C): 36.5 (03-13-19 @ 18:43), Max: 37.1 (03-13-19 @ 05:10)  HR: 83 (03-13-19 @ 19:25) (72 - 92)  BP: 111/65 (03-13-19 @ 19:25) (91/55 - 138/80)  RR: 17 (03-13-19 @ 18:43) (16 - 18)  SpO2: 98% (03-13-19 @ 19:25) (97% - 100%)  Wt(kg): --   I&O's Detail    GENERAL: NAD, well-groomed, well-developed  HEAD:  Atraumatic, Normocephalic  EYES: EOMI, PERRLA, conjunctiva and sclera clear  ENMT: Moist mucous membranes  NECK: Supple, No JVD  NERVOUS SYSTEM:  Alert & Oriented X3, Motor Strength 5/5 B/L upper and lower extremities; DTRs 2+ intact and symmetric  CHEST/LUNG: Clear to auscultation bilaterally; No rales, rhonchi, wheezing, or rubs  HEART: Regular rate and rhythm; No murmurs, rubs, or gallops  ABDOMEN: Soft, Nontender, Nondistended; Bowel sounds present  EXTREMITIES:  2+ Peripheral Pulses, No clubbing, cyanosis, or edema    MEDICATIONS  (STANDING):  atorvastatin 10 milliGRAM(s) Oral at bedtime  ciprofloxacin     Tablet 250 milliGRAM(s) Oral two times a day  heparin  Injectable 5000 Unit(s) SubCutaneous every 8 hours  levothyroxine 25 MICROGram(s) Oral daily  lisinopril 2.5 milliGRAM(s) Oral daily  metroNIDAZOLE    Tablet 500 milliGRAM(s) Oral every 8 hours  pantoprazole    Tablet 40 milliGRAM(s) Oral before breakfast    MEDICATIONS  (PRN):  ALPRAZolam 0.25 milliGRAM(s) Oral three times a day PRN anxiety  aluminum hydroxide/magnesium hydroxide/simethicone Suspension 30 milliLiter(s) Oral every 6 hours PRN Dyspepsia  ondansetron Injectable 8 milliGRAM(s) IV Push every 8 hours PRN Nausea and/or Vomiting      LABS:                        11.3   6.58  )-----------( 149      ( 13 Mar 2019 06:32 )             33.9     03-13    141  |  108  |  17  ----------------------------<  98  3.1<L>   |  26  |  0.99    Ca    8.2<L>      13 Mar 2019 06:32          CAPILLARY BLOOD GLUCOSE      POCT Blood Glucose.: 97 mg/dL (13 Mar 2019 16:46)  POCT Blood Glucose.: 94 mg/dL (13 Mar 2019 11:11)  POCT Blood Glucose.: 85 mg/dL (13 Mar 2019 07:51)  POCT Blood Glucose.: 96 mg/dL (12 Mar 2019 23:12)            RECENT CULTURES:      RADIOLOGY & ADDITIONAL TESTS:      DVT/GI ppx  Discussed with pt @ bedside

## 2019-03-13 NOTE — DIETITIAN INITIAL EVALUATION ADULT. - SOURCE
Pt forgetful states "I'm having a problem with my memory"; Medical chart review/patient/other (specify)

## 2019-03-13 NOTE — PROGRESS NOTE ADULT - ASSESSMENT
Problem/Plan - 1:  ·  Problem: Pancolitis.  Plan: Zofran 8 mg IVP Q8hrly PRN for nausea/vomiting  Sent Stool culture, Ova parasite and Blood culture x 2 sets  Continue Cipro and Flagyl  Tylenol 650mg PO Q8hrly PRN for fever & pain.  continue low fiber diet  GI eval      Problem/Plan - 2:  ·  Problem: Essential hypertension.  Plan: Continue lisinopril.     Problem/Plan - 3:  ·  Problem: Hypothyroidism (acquired).  Plan: Continue Synthroid.     Problem/Plan - 4:  ·  Problem: Gastroesophageal reflux disease without esophagitis.  Plan: Continue PPI.     Problem/Plan - 5:  ·  Problem: Mixed hyperlipidemia.  Plan: Continue statin. Problem/Plan - 1:  ·  Problem: Pancolitis.  Plan: Zofran 8 mg IVP Q8hrly PRN for nausea/vomiting  Sent Stool culture, Ova parasite and Blood culture x 2 sets  Continue Cipro and Flagyl  Tylenol 650mg PO Q8hrly PRN for fever & pain.  continue low fiber diet  GI eval appreciated  d/c planning for am       Problem/Plan - 2:  ·  Problem: Essential hypertension.  Plan: Continue lisinopril.     Problem/Plan - 3:  ·  Problem: Hypothyroidism (acquired).  Plan: Continue Synthroid.     Problem/Plan - 4:  ·  Problem: Gastroesophageal reflux disease without esophagitis.  Plan: Continue PPI.     Problem/Plan - 5:  ·  Problem: Mixed hyperlipidemia.  Plan: Continue statin.

## 2019-03-13 NOTE — DIETITIAN INITIAL EVALUATION ADULT. - ORAL INTAKE PTA
fair/Pt states "I don't eat very much" & presents prolonged poor appetite & po intake; Breakfast Coffee, oatmeal or cheerios Lunch; skips & no snacks Supper; Veg, meat & starch

## 2019-03-13 NOTE — PROGRESS NOTE ADULT - SUBJECTIVE AND OBJECTIVE BOX
Patient is a 85y old  Female who presents with a chief complaint of colitis (12 Mar 2019 16:53)      HPI:  85 year old woman with PMH HTN, hypothyroidism, HLD, gerd, (diverticulitis or UC? pt unsure), hiatal hernia presents with several days of abdominal pain, diarrhea, and nausea.  She states that she think she was diagnosed with either diverticulitis or UC many years ago; her daughter is bedside who does have UC and is unsure if patient has it or was ever on treatment for it.  Patient denies fever, chills, recent travel, food outside of routine, blood in stool.    CT ab/plv shows colitis without diverticulitis.  Mild leukocytosis. (11 Mar 2019 01:39)      INTERVAL HPI/OVERNIGHT EVENTS:  No diarrhea or mucus. Heartburn significantly better today. Tolerating regular diet.  Patient is very happy with her condition and wants to go home. On PO antibiotics    MEDICATIONS  (STANDING):  atorvastatin 10 milliGRAM(s) Oral at bedtime  ciprofloxacin     Tablet 250 milliGRAM(s) Oral two times a day  heparin  Injectable 5000 Unit(s) SubCutaneous every 8 hours  levothyroxine 25 MICROGram(s) Oral daily  lisinopril 2.5 milliGRAM(s) Oral daily  metroNIDAZOLE    Tablet 500 milliGRAM(s) Oral every 8 hours  pantoprazole    Tablet 40 milliGRAM(s) Oral before breakfast    MEDICATIONS  (PRN):  ALPRAZolam 0.25 milliGRAM(s) Oral three times a day PRN anxiety  aluminum hydroxide/magnesium hydroxide/simethicone Suspension 30 milliLiter(s) Oral every 6 hours PRN Dyspepsia  ondansetron Injectable 8 milliGRAM(s) IV Push every 8 hours PRN Nausea and/or Vomiting      FAMILY HISTORY:      Allergies    No Known Allergies    Intolerances    penicillins (Unknown)      PMH/PSH:        REVIEW OF SYSTEMS:  CONSTITUTIONAL: No fever, weight loss, or fatigue  EYES: No eye pain, visual disturbances, or discharge  ENMT:  No difficulty hearing, tinnitus, vertigo; No sinus or throat pain  NECK: No pain or stiffness  BREASTS: No pain, masses, or nipple discharge  RESPIRATORY: No cough, wheezing, chills or hemoptysis; No shortness of breath  CARDIOVASCULAR: No chest pain, palpitations, dizziness, or leg swelling  GASTROINTESTINAL: See above  GENITOURINARY: No dysuria, frequency, hematuria, or incontinence  NEUROLOGICAL: No headaches, memory loss, loss of strength, numbness, or tremors  SKIN: No itching, burning, rashes, or lesions   LYMPH NODES: No enlarged glands  ENDOCRINE: No heat or cold intolerance; No hair loss  MUSCULOSKELETAL: No joint pain or swelling; No muscle, back, or extremity pain  PSYCHIATRIC: No depression, anxiety, mood swings, or difficulty sleeping  HEME/LYMPH: No easy bruising, or bleeding gums  ALLERGY AND IMMUNOLOGIC: No hives or eczema    Vital Signs Last 24 Hrs  T(C): 36.6 (13 Mar 2019 11:00), Max: 37.1 (13 Mar 2019 05:10)  T(F): 97.9 (13 Mar 2019 11:00), Max: 98.8 (13 Mar 2019 05:10)  HR: 72 (13 Mar 2019 11:00) (72 - 89)  BP: 105/75 (13 Mar 2019 11:00) (91/55 - 127/73)  BP(mean): --  RR: 16 (13 Mar 2019 11:00) (16 - 18)  SpO2: 99% (13 Mar 2019 11:00) (96% - 99%)    PHYSICAL EXAM:  GENERAL: NAD, well-groomed, well-developed  HEAD:  Atraumatic, Normocephalic  EYES: EOMI, PERRLA, conjunctiva and sclera clear  NECK: Supple, No JVD, Normal thyroid  NERVOUS SYSTEM:  Alert & Oriented X3, Good concentration; Motor Strength 5/5 B/L upper and lower extremities;  CHEST/LUNG: Clear to percussion bilaterally; No rales, rhonchi, wheezing, or rubs  HEART: Regular rate and rhythm; No murmurs, rubs, or gallops  ABDOMEN: Soft, Nontender, Nondistended; Bowel sounds present  EXTREMITIES:  2+ Peripheral Pulses, No clubbing, cyanosis, or edema  LYMPH: No lymphadenopathy noted  SKIN: No rashes or lesions    LAB  03-10 @ 22:13  amylase --   lipase 84                           11.3   6.58  )-----------( 149      ( 13 Mar 2019 06:32 )             33.9       CBC:  03-13 @ 06:32  WBC 6.58   Hgb 11.3   Hct 33.9   Plts 149  MCV 89.7  03-11 @ 08:18  WBC 10.65   Hgb 13.6   Hct 41.0   Plts 214  MCV 89.5  03-10 @ 22:13  WBC 11.50   Hgb 14.5   Hct 44.2   Plts 197  MCV 89.7      Chemistry:  03-13 @ 06:32  Na+ 141  K+ 3.1  Cl- 108  CO2 26  BUN 17  Cr 0.99     03-11 @ 08:18  Na+ 141  K+ 3.6  Cl- 104  CO2 27  BUN 19  Cr 1.15     03-10 @ 22:13  Na+ 142  K+ 3.5  Cl- 105  CO2 27  BUN 19  Cr 1.12         Glucose, Serum: 98 mg/dL (03-13 @ 06:32)  Glucose, Serum: 106 mg/dL (03-11 @ 08:18)  Glucose, Serum: 118 mg/dL (03-10 @ 22:13)      13 Mar 2019 06:32    141    |  108    |  17     ----------------------------<  98     3.1     |  26     |  0.99   11 Mar 2019 08:18    141    |  104    |  19     ----------------------------<  106    3.6     |  27     |  1.15   10 Mar 2019 22:13    142    |  105    |  19     ----------------------------<  118    3.5     |  27     |  1.12     Ca    8.2        13 Mar 2019 06:32  Ca    9.1        11 Mar 2019 08:18  Ca    9.3        10 Mar 2019 22:13  Mg     1.9       10 Mar 2019 22:13    TPro  7.7    /  Alb  3.9    /  TBili  1.1    /  DBili  x      /  AST  20     /  ALT  20     /  AlkPhos  78     10 Mar 2019 22:13              CAPILLARY BLOOD GLUCOSE      POCT Blood Glucose.: 94 mg/dL (13 Mar 2019 11:11)  POCT Blood Glucose.: 85 mg/dL (13 Mar 2019 07:51)  POCT Blood Glucose.: 96 mg/dL (12 Mar 2019 23:12)  POCT Blood Glucose.: 97 mg/dL (12 Mar 2019 16:32)          RADIOLOGY & ADDITIONAL TESTS:    Imaging Personally Reviewed:  [ ] YES  [ ] NO    Consultant(s) Notes Reviewed:  [ ] YES  [ ] NO    Care Discussed with Consultants/Other Providers [ ] YES  [ ] NO

## 2019-03-13 NOTE — CHART NOTE - NSCHARTNOTEFT_GEN_A_CORE
Upon Nutritional Assessment by the Registered Dietitian your patient was determined to meet criteria / has evidence of the following diagnosis/diagnoses:          [ ]  Mild Protein Calorie Malnutrition        [ ]  Moderate Protein Calorie Malnutrition        [x ] Severe Protein Calorie Malnutrition        [ ] Unspecified Protein Calorie Malnutrition        [x ] Underweight / BMI <19        [ ] Morbid Obesity / BMI > 40      Findings as based on:  •  Comprehensive nutrition assessment and consultation  •  Calorie counts (nutrient intake analysis)  •  Food acceptance and intake status from observations by staff  •  Follow up  •  Patient education  •  Intervention secondary to interdisciplinary rounds  •   concerns      Treatment:    The following diet has been recommended:  Low Fiber/Ensure Enlive 2 x day(700kcal & 40gm protein)    PROVIDER Section:     By signing this assessment you are acknowledging and agree with the diagnosis/diagnoses assigned by the Registered Dietitian    Comments:

## 2019-03-13 NOTE — DIETITIAN INITIAL EVALUATION ADULT. - PERTINENT MEDS FT
ALPRAZolam PRN  aluminum hydroxide/magnesium hydroxide/simethicone Suspension PRN  atorvastatin  ciprofloxacin     Tablet  heparin  Injectable  levothyroxine  lisinopril  metroNIDAZOLE    Tablet  ondansetron Injectable PRN  pantoprazole    Tablet

## 2019-03-13 NOTE — PHYSICAL THERAPY INITIAL EVALUATION ADULT - PERTINENT HX OF CURRENT PROBLEM, REHAB EVAL
Per H&P, Pt is an 85 year old woman with PMH HTN, hypothyroidism, HLD, gerd, hiatal hernia presents with several days of abdominal pain, diarrhea, and nausea.

## 2019-03-13 NOTE — DIETITIAN INITIAL EVALUATION ADULT. - PERTINENT LABORATORY DATA
03-13 Na 141 mmol/L Glu 98 mg/dL K+ 3.1 mmol/L<L> Cr 0.99 mg/dL BUN 17 mg/dL Phos n/a   Alb 3.9(3/10)   PAB n/a   Hgb 11.3 g/dL<L> Hct 33.9 %<L> HgA1C n/a    Glucose, Serum: 98 mg/dL

## 2019-03-13 NOTE — DIETITIAN INITIAL EVALUATION ADULT. - PHYSICAL APPEARANCE
BMI=18.1; no edema; Nutrition focused physical exam conducted; Subcutaneous fat loss: [severe ] Orbital fat pads region, [moderate ]Buccal fat region, [moderate ]Triceps region,  [severe ]Ribs region.  Muscle wasting: [severe ]Temples region, [severe ]Clavicle region, [severe ]Shoulder region, [unable ]Scapula region, [moderate ]Interosseous region,  [moderate ]thigh region, [moderate ]Calf region/underweight

## 2019-03-13 NOTE — PROGRESS NOTE ADULT - ASSESSMENT
HPI:  85 year old woman with PMH HTN, hypothyroidism, HLD, gerd, (diverticulitis or UC? pt unsure), hiatal hernia presents with several days of abdominal pain, diarrhea, and nausea.  She states that she think she was diagnosed with either diverticulitis or UC many years ago; her daughter is bedside who does have UC and is unsure if patient has it or was ever on treatment for it.  Patient denies fever, chills, recent travel, food outside of routine, blood in stool.    CT ab/plv shows colitis without diverticulitis.  Mild leukocytosis. (11 Mar 2019 01:39)  ----------------------- As Above -----------------------------------  Histopry obtained from patient and daughter. Patient presented with significant burning in her chest that radiated to her neck. This is not unusual but it is never to this degree or as frequent. The patient is on Acufex for it.  She also had mucus the size of a quarter through out Sunday. ( non bloody ) It is not associated with her previous flare ups for her UC. The patient states that she has a history of UC diagnosed > 20 years ago. No flare ups for a while. She is on no meds for it. Last colonoscopy ( negative ) was 3 - 5 years ag.  Patient states that she had no BMs since arrival. Chest slightly better. Biggest problem today is anxiety. Tolerating regular diet.  See CT scan / labs    1) GERD - Carafate, PPI, anti reflux regiment  2) Colitis - Significantly better. - Change anti biotics to PO. Patient stable from GI point of view for discharge in AM if she remains asymptomatic and tolerates diet.    [id number] - 1) GERD - Patient significantly better - Patient refusing any in - patient GI testing including EGD. Continue present care  2) Colitis - Asymptomatic . On PO Cipro  /Flagyl   3) Decreased H/H - No MHH--------- Patient stable for discharge from GI point of view with a total of 7 days of antibiotics. Patient states that she will follow up with her gastroenterologist ASAP.

## 2019-03-13 NOTE — DIETITIAN INITIAL EVALUATION ADULT. - PROBLEM SELECTOR PLAN 1
Zofran 8 mg IVP Q8hrly PRN for nausea/vomiting  Sent Stool culture, Ova parasite and Blood culture x 2 sets  Continue Cipro and Flagyl  Tylenol 650mg PO Q8hrly PRN for fever & pain.  Start with Clear liquids PO when patient tolerates advance diet.  Would confirm PMH regarding UC vs diverticulitis with PMD.  Per patient and daughter, she was ever on steroids or other UC meds.

## 2019-03-13 NOTE — DIETITIAN INITIAL EVALUATION ADULT. - FACTORS AFF FOOD INTAKE
+abdominal pain & C/o gastric reflux/persistent diarrhea/persistent lack of appetite/pain/persistent nausea/other (specify)

## 2019-03-13 NOTE — DIETITIAN INITIAL EVALUATION ADULT. - OTHER INFO
Pt seen for BMI<19. Pt lives with daughter; pt's tries to assist with cooking & daughter does majority of cooking & food shopping. Pt c/o persistent gastric reflux causing decreased appetite & po intake. Pt with natural teeth & left upper dentures home; pt reports no difficulty chewing or swallowing. Pt reports last BM x 1(3/13); pt reports no further GI distress.

## 2019-03-14 ENCOUNTER — TRANSCRIPTION ENCOUNTER (OUTPATIENT)
Age: 84
End: 2019-03-14

## 2019-03-14 VITALS
TEMPERATURE: 98 F | RESPIRATION RATE: 18 BRPM | DIASTOLIC BLOOD PRESSURE: 66 MMHG | SYSTOLIC BLOOD PRESSURE: 115 MMHG | OXYGEN SATURATION: 99 % | HEART RATE: 87 BPM

## 2019-03-14 LAB
GLUCOSE BLDC GLUCOMTR-MCNC: 100 MG/DL — HIGH (ref 70–99)
GLUCOSE BLDC GLUCOMTR-MCNC: 120 MG/DL — HIGH (ref 70–99)

## 2019-03-14 PROCEDURE — 99239 HOSP IP/OBS DSCHRG MGMT >30: CPT

## 2019-03-14 RX ORDER — LEVOTHYROXINE SODIUM 125 MCG
1 TABLET ORAL
Qty: 0 | Refills: 0 | COMMUNITY

## 2019-03-14 RX ORDER — LACTOBACILLUS ACIDOPHILUS 100MM CELL
1 CAPSULE ORAL
Qty: 30 | Refills: 0 | OUTPATIENT
Start: 2019-03-14 | End: 2019-04-12

## 2019-03-14 RX ORDER — ALPRAZOLAM 0.25 MG
1 TABLET ORAL
Qty: 0 | Refills: 0 | COMMUNITY

## 2019-03-14 RX ORDER — CIPROFLOXACIN LACTATE 400MG/40ML
1 VIAL (ML) INTRAVENOUS
Qty: 22 | Refills: 0 | OUTPATIENT
Start: 2019-03-14 | End: 2019-03-24

## 2019-03-14 RX ORDER — LISINOPRIL 2.5 MG/1
1 TABLET ORAL
Qty: 0 | Refills: 0 | COMMUNITY

## 2019-03-14 RX ORDER — PANTOPRAZOLE SODIUM 20 MG/1
1 TABLET, DELAYED RELEASE ORAL
Qty: 30 | Refills: 0 | OUTPATIENT
Start: 2019-03-14 | End: 2019-04-12

## 2019-03-14 RX ORDER — METRONIDAZOLE 500 MG
1 TABLET ORAL
Qty: 33 | Refills: 0 | OUTPATIENT
Start: 2019-03-14 | End: 2019-03-24

## 2019-03-14 RX ORDER — RABEPRAZOLE 20 MG/1
0 TABLET, DELAYED RELEASE ORAL
Qty: 0 | Refills: 0 | COMMUNITY

## 2019-03-14 RX ORDER — ATORVASTATIN CALCIUM 80 MG/1
1 TABLET, FILM COATED ORAL
Qty: 0 | Refills: 0 | COMMUNITY

## 2019-03-14 RX ORDER — ERGOCALCIFEROL 1.25 MG/1
0 CAPSULE ORAL
Qty: 0 | Refills: 0 | COMMUNITY

## 2019-03-14 RX ORDER — ERGOCALCIFEROL 1.25 MG/1
1 CAPSULE ORAL
Qty: 0 | Refills: 0 | COMMUNITY

## 2019-03-14 RX ADMIN — HEPARIN SODIUM 5000 UNIT(S): 5000 INJECTION INTRAVENOUS; SUBCUTANEOUS at 05:29

## 2019-03-14 RX ADMIN — Medication 30 MILLILITER(S): at 05:29

## 2019-03-14 RX ADMIN — PANTOPRAZOLE SODIUM 40 MILLIGRAM(S): 20 TABLET, DELAYED RELEASE ORAL at 08:06

## 2019-03-14 RX ADMIN — Medication 25 MICROGRAM(S): at 05:29

## 2019-03-14 RX ADMIN — Medication 500 MILLIGRAM(S): at 05:29

## 2019-03-14 RX ADMIN — LISINOPRIL 2.5 MILLIGRAM(S): 2.5 TABLET ORAL at 05:29

## 2019-03-14 RX ADMIN — Medication 250 MILLIGRAM(S): at 05:29

## 2019-03-14 RX ADMIN — Medication 500 MILLIGRAM(S): at 14:25

## 2019-03-14 NOTE — PROGRESS NOTE ADULT - SUBJECTIVE AND OBJECTIVE BOX
Patient is a 85y old  Female who presents with a chief complaint of colitis (14 Mar 2019 08:39)      HPI:  85 year old woman with PMH HTN, hypothyroidism, HLD, gerd, (diverticulitis or UC? pt unsure), hiatal hernia presents with several days of abdominal pain, diarrhea, and nausea.  She states that she think she was diagnosed with either diverticulitis or UC many years ago; her daughter is bedside who does have UC and is unsure if patient has it or was ever on treatment for it.  Patient denies fever, chills, recent travel, food outside of routine, blood in stool.    CT ab/plv shows colitis without diverticulitis.  Mild leukocytosis. (11 Mar 2019 01:39)      INTERVAL HPI/OVERNIGHT EVENTS:    MEDICATIONS  (STANDING):  atorvastatin 10 milliGRAM(s) Oral at bedtime  ciprofloxacin     Tablet 250 milliGRAM(s) Oral two times a day  heparin  Injectable 5000 Unit(s) SubCutaneous every 8 hours  levothyroxine 25 MICROGram(s) Oral daily  lisinopril 2.5 milliGRAM(s) Oral daily  metroNIDAZOLE    Tablet 500 milliGRAM(s) Oral every 8 hours  pantoprazole    Tablet 40 milliGRAM(s) Oral before breakfast    MEDICATIONS  (PRN):  ALPRAZolam 0.25 milliGRAM(s) Oral three times a day PRN anxiety  aluminum hydroxide/magnesium hydroxide/simethicone Suspension 30 milliLiter(s) Oral every 6 hours PRN Dyspepsia  ondansetron Injectable 8 milliGRAM(s) IV Push every 8 hours PRN Nausea and/or Vomiting      FAMILY HISTORY:      Allergies    No Known Allergies    Intolerances    penicillins (Unknown)      PMH/PSH:        REVIEW OF SYSTEMS:  CONSTITUTIONAL: No fever, weight loss, or fatigue  EYES: No eye pain, visual disturbances, or discharge  ENMT:  No difficulty hearing, tinnitus, vertigo; No sinus or throat pain  NECK: No pain or stiffness  BREASTS: No pain, masses, or nipple discharge  RESPIRATORY: No cough, wheezing, chills or hemoptysis; No shortness of breath  CARDIOVASCULAR: No chest pain, palpitations, dizziness, or leg swelling  GASTROINTESTINAL: No abdominal or epigastric pain. No nausea, vomiting, or hematemesis; No diarrhea or constipation. No melena or hematochezia.  GENITOURINARY: No dysuria, frequency, hematuria, or incontinence  NEUROLOGICAL: No headaches, memory loss, loss of strength, numbness, or tremors  SKIN: No itching, burning, rashes, or lesions   LYMPH NODES: No enlarged glands  ENDOCRINE: No heat or cold intolerance; No hair loss  MUSCULOSKELETAL: No joint pain or swelling; No muscle, back, or extremity pain  PSYCHIATRIC: No depression, anxiety, mood swings, or difficulty sleeping  HEME/LYMPH: No easy bruising, or bleeding gums  ALLERGY AND IMMUNOLOGIC: No hives or eczema    Vital Signs Last 24 Hrs  T(C): 36.6 (14 Mar 2019 11:36), Max: 36.6 (14 Mar 2019 05:40)  T(F): 97.8 (14 Mar 2019 11:36), Max: 97.8 (14 Mar 2019 05:40)  HR: 87 (14 Mar 2019 11:36) (62 - 92)  BP: 115/66 (14 Mar 2019 11:36) (105/60 - 138/80)  BP(mean): --  RR: 18 (14 Mar 2019 11:36) (16 - 18)  SpO2: 99% (14 Mar 2019 11:36) (98% - 100%)    PHYSICAL EXAM:  GENERAL: NAD, well-groomed, well-developed  HEAD:  Atraumatic, Normocephalic  EYES: EOMI, PERRLA, conjunctiva and sclera clear  ENMT: No tonsillar erythema, exudates, or enlargement; Moist mucous membranes, Good dentition, No lesions  NECK: Supple, No JVD, Normal thyroid  NERVOUS SYSTEM:  Alert & Oriented X3, Good concentration; Motor Strength 5/5 B/L upper and lower extremities; DTRs 2+ intact and symmetric  CHEST/LUNG: Clear to percussion bilaterally; No rales, rhonchi, wheezing, or rubs  HEART: Regular rate and rhythm; No murmurs, rubs, or gallops  ABDOMEN: Soft, Nontender, Nondistended; Bowel sounds present  EXTREMITIES:  2+ Peripheral Pulses, No clubbing, cyanosis, or edema  LYMPH: No lymphadenopathy noted  SKIN: No rashes or lesions    LAB  03-10 @ 22:13  amylase --   lipase 84                           11.3   6.58  )-----------( 149      ( 13 Mar 2019 06:32 )             33.9       CBC:  03-13 @ 06:32  WBC 6.58   Hgb 11.3   Hct 33.9   Plts 149  MCV 89.7  03-11 @ 08:18  WBC 10.65   Hgb 13.6   Hct 41.0   Plts 214  MCV 89.5  03-10 @ 22:13  WBC 11.50   Hgb 14.5   Hct 44.2   Plts 197  MCV 89.7      Chemistry:  03-13 @ 06:32  Na+ 141  K+ 3.1  Cl- 108  CO2 26  BUN 17  Cr 0.99     03-11 @ 08:18  Na+ 141  K+ 3.6  Cl- 104  CO2 27  BUN 19  Cr 1.15     03-10 @ 22:13  Na+ 142  K+ 3.5  Cl- 105  CO2 27  BUN 19  Cr 1.12         Glucose, Serum: 98 mg/dL (03-13 @ 06:32)  Glucose, Serum: 106 mg/dL (03-11 @ 08:18)  Glucose, Serum: 118 mg/dL (03-10 @ 22:13)      13 Mar 2019 06:32    141    |  108    |  17     ----------------------------<  98     3.1     |  26     |  0.99   11 Mar 2019 08:18    141    |  104    |  19     ----------------------------<  106    3.6     |  27     |  1.15   10 Mar 2019 22:13    142    |  105    |  19     ----------------------------<  118    3.5     |  27     |  1.12     Ca    8.2        13 Mar 2019 06:32  Ca    9.1        11 Mar 2019 08:18  Ca    9.3        10 Mar 2019 22:13  Mg     1.9       10 Mar 2019 22:13    TPro  7.7    /  Alb  3.9    /  TBili  1.1    /  DBili  x      /  AST  20     /  ALT  20     /  AlkPhos  78     10 Mar 2019 22:13              CAPILLARY BLOOD GLUCOSE      POCT Blood Glucose.: 120 mg/dL (14 Mar 2019 10:43)  POCT Blood Glucose.: 100 mg/dL (14 Mar 2019 07:33)  POCT Blood Glucose.: 92 mg/dL (13 Mar 2019 22:42)  POCT Blood Glucose.: 97 mg/dL (13 Mar 2019 16:46)          RADIOLOGY & ADDITIONAL TESTS:    Imaging Personally Reviewed:  [ ] YES  [ ] NO    Consultant(s) Notes Reviewed:  [ ] YES  [ ] NO    Care Discussed with Consultants/Other Providers [ ] YES  [ ] NO Patient is a 85y old  Female who presents with a chief complaint of colitis (14 Mar 2019 08:39)      HPI:  85 year old woman with PMH HTN, hypothyroidism, HLD, gerd, (diverticulitis or UC? pt unsure), hiatal hernia presents with several days of abdominal pain, diarrhea, and nausea.  She states that she think she was diagnosed with either diverticulitis or UC many years ago; her daughter is bedside who does have UC and is unsure if patient has it or was ever on treatment for it.  Patient denies fever, chills, recent travel, food outside of routine, blood in stool.    CT ab/plv shows colitis without diverticulitis.  Mild leukocytosis. (11 Mar 2019 01:39)      INTERVAL HPI/OVERNIGHT EVENTS:  Patient seen earlier today.  Burning pain in chest continues to improve. No diarrhea. Tolerating regular diet.  The patient denies melena, hematochezia, hematemesis, nausea, vomiting, abdominal pain, constipation, diarrhea, or change in bowel movements     MEDICATIONS  (STANDING):  atorvastatin 10 milliGRAM(s) Oral at bedtime  ciprofloxacin     Tablet 250 milliGRAM(s) Oral two times a day  heparin  Injectable 5000 Unit(s) SubCutaneous every 8 hours  levothyroxine 25 MICROGram(s) Oral daily  lisinopril 2.5 milliGRAM(s) Oral daily  metroNIDAZOLE    Tablet 500 milliGRAM(s) Oral every 8 hours  pantoprazole    Tablet 40 milliGRAM(s) Oral before breakfast    MEDICATIONS  (PRN):  ALPRAZolam 0.25 milliGRAM(s) Oral three times a day PRN anxiety  aluminum hydroxide/magnesium hydroxide/simethicone Suspension 30 milliLiter(s) Oral every 6 hours PRN Dyspepsia  ondansetron Injectable 8 milliGRAM(s) IV Push every 8 hours PRN Nausea and/or Vomiting      FAMILY HISTORY:      Allergies    No Known Allergies    Intolerances    penicillins (Unknown)      PMH/PSH:        REVIEW OF SYSTEMS:  CONSTITUTIONAL: No fever, weight loss, or fatigue  EYES: No eye pain, visual disturbances, or discharge  ENMT:  No difficulty hearing, tinnitus, vertigo; No sinus or throat pain  NECK: No pain or stiffness  BREASTS: No pain, masses, or nipple discharge  RESPIRATORY: No cough, wheezing, chills or hemoptysis; No shortness of breath  CARDIOVASCULAR: No chest pain, palpitations, dizziness, or leg swelling  GASTROINTESTINAL: See above  GENITOURINARY: No dysuria, frequency, hematuria, or incontinence  NEUROLOGICAL: No headaches, memory loss, loss of strength, numbness, or tremors  SKIN: No itching, burning, rashes, or lesions   LYMPH NODES: No enlarged glands  ENDOCRINE: No heat or cold intolerance; No hair loss  MUSCULOSKELETAL: No joint pain or swelling; No muscle, back, or extremity pain  PSYCHIATRIC: No depression, anxiety, mood swings, or difficulty sleeping  HEME/LYMPH: No easy bruising, or bleeding gums  ALLERGY AND IMMUNOLOGIC: No hives or eczema    Vital Signs Last 24 Hrs  T(C): 36.6 (14 Mar 2019 11:36), Max: 36.6 (14 Mar 2019 05:40)  T(F): 97.8 (14 Mar 2019 11:36), Max: 97.8 (14 Mar 2019 05:40)  HR: 87 (14 Mar 2019 11:36) (62 - 92)  BP: 115/66 (14 Mar 2019 11:36) (105/60 - 138/80)  BP(mean): --  RR: 18 (14 Mar 2019 11:36) (16 - 18)  SpO2: 99% (14 Mar 2019 11:36) (98% - 100%)    PHYSICAL EXAM:  GENERAL: NAD, well-groomed, well-developed  HEAD:  Atraumatic, Normocephalic  EYES: EOMI, PERRLA, conjunctiva and sclera clear  ENMT: No tonsillar erythema, exudates, or enlargement; Moist mucous membranes, Good dentition, No lesions  NECK: Supple, No JVD, Normal thyroid  NERVOUS SYSTEM:  Alert & Oriented X3, Good concentration;   CHEST/LUNG: Clear to percussion bilaterally; No rales, rhonchi, wheezing, or rubs  HEART: Regular rate and rhythm; No murmurs, rubs, or gallops  ABDOMEN: Soft, Nontender, Nondistended; Bowel sounds present  EXTREMITIES:  2+ Peripheral Pulses, No clubbing, cyanosis, or edema  LYMPH: No lymphadenopathy noted  SKIN: No rashes or lesions    LAB  03-10 @ 22:13  amylase --   lipase 84                           11.3   6.58  )-----------( 149      ( 13 Mar 2019 06:32 )             33.9       CBC:  03-13 @ 06:32  WBC 6.58   Hgb 11.3   Hct 33.9   Plts 149  MCV 89.7  03-11 @ 08:18  WBC 10.65   Hgb 13.6   Hct 41.0   Plts 214  MCV 89.5  03-10 @ 22:13  WBC 11.50   Hgb 14.5   Hct 44.2   Plts 197  MCV 89.7      Chemistry:  03-13 @ 06:32  Na+ 141  K+ 3.1  Cl- 108  CO2 26  BUN 17  Cr 0.99     03-11 @ 08:18  Na+ 141  K+ 3.6  Cl- 104  CO2 27  BUN 19  Cr 1.15     03-10 @ 22:13  Na+ 142  K+ 3.5  Cl- 105  CO2 27  BUN 19  Cr 1.12         Glucose, Serum: 98 mg/dL (03-13 @ 06:32)  Glucose, Serum: 106 mg/dL (03-11 @ 08:18)  Glucose, Serum: 118 mg/dL (03-10 @ 22:13)      13 Mar 2019 06:32    141    |  108    |  17     ----------------------------<  98     3.1     |  26     |  0.99   11 Mar 2019 08:18    141    |  104    |  19     ----------------------------<  106    3.6     |  27     |  1.15   10 Mar 2019 22:13    142    |  105    |  19     ----------------------------<  118    3.5     |  27     |  1.12     Ca    8.2        13 Mar 2019 06:32  Ca    9.1        11 Mar 2019 08:18  Ca    9.3        10 Mar 2019 22:13  Mg     1.9       10 Mar 2019 22:13    TPro  7.7    /  Alb  3.9    /  TBili  1.1    /  DBili  x      /  AST  20     /  ALT  20     /  AlkPhos  78     10 Mar 2019 22:13              CAPILLARY BLOOD GLUCOSE      POCT Blood Glucose.: 120 mg/dL (14 Mar 2019 10:43)  POCT Blood Glucose.: 100 mg/dL (14 Mar 2019 07:33)  POCT Blood Glucose.: 92 mg/dL (13 Mar 2019 22:42)  POCT Blood Glucose.: 97 mg/dL (13 Mar 2019 16:46)          RADIOLOGY & ADDITIONAL TESTS:    Imaging Personally Reviewed:  [ ] YES  [ ] NO    Consultant(s) Notes Reviewed:  [ ] YES  [ ] NO    Care Discussed with Consultants/Other Providers [ ] YES  [ ] NO

## 2019-03-14 NOTE — PROGRESS NOTE ADULT - ASSESSMENT
HPI:  85 year old woman with PMH HTN, hypothyroidism, HLD, gerd, (diverticulitis or UC? pt unsure), hiatal hernia presents with several days of abdominal pain, diarrhea, and nausea.  She states that she think she was diagnosed with either diverticulitis or UC many years ago; her daughter is bedside who does have UC and is unsure if patient has it or was ever on treatment for it.  Patient denies fever, chills, recent travel, food outside of routine, blood in stool.    CT ab/plv shows colitis without diverticulitis.  Mild leukocytosis. (11 Mar 2019 01:39)  ----------------------- As Above -----------------------------------  Histopry obtained from patient and daughter. Patient presented with significant burning in her chest that radiated to her neck. This is not unusual but it is never to this degree or as frequent. The patient is on Acufex for it.  She also had mucus the size of a quarter through out Sunday. ( non bloody ) It is not associated with her previous flare ups for her UC. The patient states that she has a history of UC diagnosed > 20 years ago. No flare ups for a while. She is on no meds for it. Last colonoscopy ( negative ) was 3 - 5 years ag.  Patient states that she had no BMs since arrival. Chest slightly better. Biggest problem today is anxiety. Tolerating regular diet.  See CT scan / labs    1) GERD - Carafate, PPI, anti reflux regiment  2) Colitis - Significantly better. - Change anti biotics to PO. Patient stable from GI point of view for discharge in AM if she remains asymptomatic and tolerates diet.    [id number] - 1) GERD - Patient significantly better again today  - Patient refusing any in - patient GI testing including EGD. Continue present care  2) Colitis - Asymptomatic . On PO Cipro  /Flagyl   3) Decreased H/H - No MHH--------- Patient stable for discharge from GI point of view with a total of 7 days of antibiotics. Patient states that she will follow up with her gastroenterologist ASAP. Discussed with patient and daughter.

## 2019-03-14 NOTE — DISCHARGE NOTE NURSING/CASE MANAGEMENT/SOCIAL WORK - NSDCPEWEB_GEN_ALL_CORE
[facility] website --- [email or web address] --- [email or web address]

## 2019-03-14 NOTE — DISCHARGE NOTE PROVIDER - HOSPITAL COURSE
Patient is a 85y old  Female who presents with a chief complaint of colitis (13 Mar 2019 21:14)                HPI:    85 year old woman with PMH HTN, hypothyroidism, HLD, gerd, (diverticulitis or UC? pt unsure), hiatal hernia presents with several days of abdominal pain, diarrhea, and nausea.  She states that she think she was diagnosed with either diverticulitis or UC many years ago; her daughter is bedside who does have UC and is unsure if patient has it or was ever on treatment for it.  Patient denies fever, chills, recent travel, food outside of routine, blood in stool.        CT ab/plv shows colitis without diverticulitis.  Mild leukocytosis. (11 Mar 2019 01:39)SUBJECTIVE & OBJECTIVE: Pt seen and examined at bedside. She has had a clinically non-significant hospital course.  She has been afebrile, has had no further episodes of diarrhea.  Seen and evaluated by GI.  Doing well at time of discharge.          PHYSICAL EXAM:    T(C): 36.6 (03-14-19 @ 05:40), Max: 36.6 (03-13-19 @ 11:00)    HR: 62 (03-14-19 @ 05:40) (62 - 92)    BP: 105/60 (03-14-19 @ 05:40) (105/60 - 138/80)    RR: 16 (03-14-19 @ 05:40) (16 - 17)    SpO2: 99% (03-14-19 @ 05:40) (98% - 100%)    Wt(kg): --     I&O's Detail        GENERAL: frail, weak appearing     HEAD:  Atraumatic, Normocephalic    EYES: EOMI, PERRLA, conjunctiva and sclera clear    ENMT: Moist mucous membranes    NECK: Supple, No JVD    NERVOUS SYSTEM:  Alert & Oriented X3, Motor Strength 5/5 B/L upper and lower extremities; DTRs 2+ intact and symmetric    CHEST/LUNG: Clear to auscultation bilaterally; No rales, rhonchi, wheezing, or rubs    HEART: Regular rate and rhythm; No murmurs, rubs, or gallops    ABDOMEN: Soft, Nontender, Nondistended; Bowel sounds present    EXTREMITIES:  2+ Peripheral Pulses, No clubbing, cyanosis, or edema        MEDICATIONS  (STANDING):    atorvastatin 10 milliGRAM(s) Oral at bedtime    ciprofloxacin     Tablet 250 milliGRAM(s) Oral two times a day    heparin  Injectable 5000 Unit(s) SubCutaneous every 8 hours    levothyroxine 25 MICROGram(s) Oral daily    lisinopril 2.5 milliGRAM(s) Oral daily    metroNIDAZOLE    Tablet 500 milliGRAM(s) Oral every 8 hours    pantoprazole    Tablet 40 milliGRAM(s) Oral before breakfast        MEDICATIONS  (PRN):    ALPRAZolam 0.25 milliGRAM(s) Oral three times a day PRN anxiety    aluminum hydroxide/magnesium hydroxide/simethicone Suspension 30 milliLiter(s) Oral every 6 hours PRN Dyspepsia    ondansetron Injectable 8 milliGRAM(s) IV Push every 8 hours PRN Nausea and/or Vomiting            LABS:                            11.3     6.58  )-----------( 149      ( 13 Mar 2019 06:32 )               33.9         03-13        141  |  108  |  17    ----------------------------<  98    3.1<L>   |  26  |  0.99        Ca    8.2<L>      13 Mar 2019 06:32        CAPILLARY BLOOD GLUCOSE    POCT Blood Glucose.: 100 mg/dL (14 Mar 2019 07:33)    POCT Blood Glucose.: 92 mg/dL (13 Mar 2019 22:42)    POCT Blood Glucose.: 97 mg/dL (13 Mar 2019 16:46)    POCT Blood Glucose.: 94 mg/dL (13 Mar 2019 11:11)        RECENT CULTURES:    Urine culture:    03-14 @ 01:31 --     Testing in progress            RADIOLOGY & ADDITIONAL TESTS:    < from: CT Abdomen and Pelvis w/ IV Cont (03.10.19 @ 23:49) >        IMPRESSION:    Duodenojejunitis. Colitis involving the transverse, descending and     sigmoid colon. No evidence of small bowel obstruction.    Benign-appearing 4.7 cm cyst in the left lower quadrant. Correlation with     prior imaging is suggested.    < end of copied text >

## 2019-03-14 NOTE — DISCHARGE NOTE NURSING/CASE MANAGEMENT/SOCIAL WORK - NSDCDPATPORTLINK_GEN_ALL_CORE
You can access the [facility] Portal, offered by [facility], by registering with the following website: [email or web address]

## 2019-03-14 NOTE — DISCHARGE NOTE PROVIDER - NSDCCPCAREPLAN_GEN_ALL_CORE_FT
PRINCIPAL DISCHARGE DIAGNOSIS  Diagnosis: Pancolitis  Assessment and Plan of Treatment: Complete 14 day treatement with Ciprofloxacin and Flagyl   Follow up with PMD in 1 week      SECONDARY DISCHARGE DIAGNOSES  Diagnosis: Functional quadriplegia  Assessment and Plan of Treatment: Out patient PT has been prescribed to improve functional status    Diagnosis: Gastroesophageal reflux disease without esophagitis  Assessment and Plan of Treatment: Take omeprazole daily    Diagnosis: Hypothyroidism (acquired)  Assessment and Plan of Treatment: Resume home thyroid medications    Diagnosis: Mixed hyperlipidemia  Assessment and Plan of Treatment: Continue home medications    Diagnosis: Moderate protein-calorie malnutrition  Assessment and Plan of Treatment: Ensure Enlive 2x daily

## 2019-03-14 NOTE — DISCHARGE NOTE NURSING/CASE MANAGEMENT/SOCIAL WORK - NSDCPEEMAIL_GEN_ALL_CORE
[facility] for Tobacco Control email [email or web address]

## 2019-03-15 LAB
CULTURE RESULTS: SIGNIFICANT CHANGE UP
SPECIMEN SOURCE: SIGNIFICANT CHANGE UP

## 2019-03-16 LAB
CULTURE RESULTS: SIGNIFICANT CHANGE UP
SPECIMEN SOURCE: SIGNIFICANT CHANGE UP

## 2019-03-23 DIAGNOSIS — K21.9 GASTRO-ESOPHAGEAL REFLUX DISEASE WITHOUT ESOPHAGITIS: ICD-10-CM

## 2019-03-23 DIAGNOSIS — E03.9 HYPOTHYROIDISM, UNSPECIFIED: ICD-10-CM

## 2019-03-23 DIAGNOSIS — Z87.19 PERSONAL HISTORY OF OTHER DISEASES OF THE DIGESTIVE SYSTEM: ICD-10-CM

## 2019-03-23 DIAGNOSIS — K52.9 NONINFECTIVE GASTROENTERITIS AND COLITIS, UNSPECIFIED: ICD-10-CM

## 2019-03-23 DIAGNOSIS — Z53.20 PROCEDURE AND TREATMENT NOT CARRIED OUT BECAUSE OF PATIENT'S DECISION FOR UNSPECIFIED REASONS: ICD-10-CM

## 2019-03-23 DIAGNOSIS — Z88.0 ALLERGY STATUS TO PENICILLIN: ICD-10-CM

## 2019-03-23 DIAGNOSIS — I10 ESSENTIAL (PRIMARY) HYPERTENSION: ICD-10-CM

## 2019-03-23 DIAGNOSIS — D72.829 ELEVATED WHITE BLOOD CELL COUNT, UNSPECIFIED: ICD-10-CM

## 2019-03-23 DIAGNOSIS — E78.2 MIXED HYPERLIPIDEMIA: ICD-10-CM

## 2019-03-23 DIAGNOSIS — R53.2 FUNCTIONAL QUADRIPLEGIA: ICD-10-CM

## 2019-09-13 PROBLEM — Z00.00 ENCOUNTER FOR PREVENTIVE HEALTH EXAMINATION: Status: ACTIVE | Noted: 2019-09-13

## 2019-11-14 ENCOUNTER — APPOINTMENT (OUTPATIENT)
Dept: GASTROENTEROLOGY | Facility: CLINIC | Age: 84
End: 2019-11-14

## 2020-02-06 ENCOUNTER — APPOINTMENT (OUTPATIENT)
Dept: GASTROENTEROLOGY | Facility: CLINIC | Age: 85
End: 2020-02-06

## 2020-02-28 ENCOUNTER — APPOINTMENT (OUTPATIENT)
Dept: GASTROENTEROLOGY | Facility: CLINIC | Age: 85
End: 2020-02-28

## 2020-04-02 ENCOUNTER — APPOINTMENT (OUTPATIENT)
Dept: GASTROENTEROLOGY | Facility: CLINIC | Age: 85
End: 2020-04-02

## 2020-07-10 NOTE — DIETITIAN INITIAL EVALUATION ADULT. - NUTRITIONGOAL OUTCOME1
Application of Fluoride Varnish    Dental Fluoride Varnish and Post-Treatment Instructions: Reviewed with aunt   used: No    Dental Fluoride applied to teeth by: Korin Silva MA  Fluoride was well tolerated    LOT #: [id number]  EXPIRATION DATE:  11/29/2021      Korin Silva MA     Pt to consume >75% meals/supplements; maintain stable wt +/-2#

## 2021-05-24 NOTE — ED ADULT NURSE NOTE - NS ED NURSE RECORD ANOTHER HT AND WT
Detail Level: Zone
Plan: Start to use Elidel at night if needed so it doesn’t burn the eyes
Render In Strict Bullet Format?: No
Continue Regimen: Use ciclopirox shampoo weekly\\nApply dermasmooth oil to scalp 3 times a week\\nApply elidel to face BID prn for flaking and itching
Yes

## 2021-08-23 NOTE — ED ADULT NURSE NOTE - NS ED NOTE ABUSE RESPONSE YN
Left IR PARACENTESIS Procedure Note    PATIENT NAME: Doris Meier  : 1967  MRN: [id number]    Pre-op Diagnosis:   1  Peritoneal metastases ([address] )    2  Other ascites      Post-op Diagnosis:   1  Peritoneal metastases ([facility] [address] )    2  Other ascites        Provider:  Elly Sawyer PA-C    No qualified resident was available, Resident is only observing    Estimated Blood Loss: minimal  Findings: left sided paracentesis  3L clear yellow ascites removed       Specimens: none     Complications:  None immediate    Anesthesia: local    Elly Sawyer PA-C     Date: 2021  Time: 2:08 PM
Yes

## 2022-12-01 NOTE — ED ADULT NURSE NOTE - CAS TRG GENERAL AIRWAY, MLM
Patent warm and dry/color normal/no rashes/no ulcers warm and dry/color normal/pale/no rashes/no ulcers

## 2024-01-03 NOTE — DIETITIAN INITIAL EVALUATION ADULT. - PROBLEM SELECTOR PROBLEM 4
Oriented - self; Oriented - place; Oriented - time
Gastroesophageal reflux disease without esophagitis

## 2025-02-22 NOTE — PHYSICAL THERAPY INITIAL EVALUATION ADULT - ADDITIONAL COMMENTS
Per Pt, Pt lives in an apartment with daughter. Pt has approximately 12 steps to bedroom. Previously Declined (within the last year)